# Patient Record
Sex: FEMALE | Race: WHITE | NOT HISPANIC OR LATINO | Employment: OTHER
[De-identification: names, ages, dates, MRNs, and addresses within clinical notes are randomized per-mention and may not be internally consistent; named-entity substitution may affect disease eponyms.]

---

## 2017-06-17 ENCOUNTER — HEALTH MAINTENANCE LETTER (OUTPATIENT)
Age: 57
End: 2017-06-17

## 2021-08-22 ENCOUNTER — HEALTH MAINTENANCE LETTER (OUTPATIENT)
Age: 61
End: 2021-08-22

## 2021-10-17 ENCOUNTER — HEALTH MAINTENANCE LETTER (OUTPATIENT)
Age: 61
End: 2021-10-17

## 2022-03-16 ENCOUNTER — TELEPHONE (OUTPATIENT)
Dept: UROLOGY | Facility: CLINIC | Age: 62
End: 2022-03-16
Payer: COMMERCIAL

## 2022-03-16 NOTE — TELEPHONE ENCOUNTER
Health Call Center    Phone Message    May a detailed message be left on voicemail: yes     Reason for Call: Patient of Dr. Ferreira (10 years ago) calling to set up an appt for retention. Dr. Ferreira did a surgery on her to help with this issue and she is needing it done again. Was not able to locate these records. Please reach out to patient to discuss/schedule. Thank you!    Action Taken: Message routed to:  [facility] ([facility] Uro    Travel Screening: Not Applicable

## 2022-04-15 ENCOUNTER — OFFICE VISIT (OUTPATIENT)
Dept: UROLOGY | Facility: CLINIC | Age: 62
End: 2022-04-15
Payer: COMMERCIAL

## 2022-04-15 VITALS
WEIGHT: 155 LBS | SYSTOLIC BLOOD PRESSURE: 128 MMHG | BODY MASS INDEX: 24.91 KG/M2 | HEIGHT: 66 IN | DIASTOLIC BLOOD PRESSURE: 70 MMHG

## 2022-04-15 DIAGNOSIS — N20.1 URETERAL STONE: ICD-10-CM

## 2022-04-15 DIAGNOSIS — R33.9 URINARY RETENTION: Primary | ICD-10-CM

## 2022-04-15 DIAGNOSIS — R39.15 URGENCY OF URINATION: ICD-10-CM

## 2022-04-15 LAB — RESIDUAL VOLUME (RV) (EXTERNAL): 48

## 2022-04-15 PROCEDURE — 99203 OFFICE O/P NEW LOW 30 MIN: CPT | Mod: 25 | Performed by: PHYSICIAN ASSISTANT

## 2022-04-15 PROCEDURE — 51798 US URINE CAPACITY MEASURE: CPT | Performed by: PHYSICIAN ASSISTANT

## 2022-04-15 RX ORDER — ESTRADIOL 0.1 MG/G
CREAM VAGINAL
Qty: 42.5 G | Refills: 3 | Status: SHIPPED | OUTPATIENT
Start: 2022-04-15 | End: 2023-06-21

## 2022-04-15 RX ORDER — ESTRADIOL 1 MG/1
1 TABLET ORAL DAILY
Status: ON HOLD | COMMUNITY
Start: 2022-01-21 | End: 2023-03-08

## 2022-04-15 RX ORDER — VILAZODONE HYDROCHLORIDE 40 MG/1
TABLET ORAL
COMMUNITY
Start: 2022-03-22

## 2022-04-15 RX ORDER — PROGESTERONE 100 MG/1
100 CAPSULE ORAL 2 TIMES DAILY
Status: ON HOLD | COMMUNITY
Start: 2022-01-14 | End: 2023-03-08

## 2022-04-15 ASSESSMENT — PAIN SCALES - GENERAL: PAINLEVEL: NO PAIN (0)

## 2022-04-15 NOTE — LETTER
4/15/2022       RE: Andree Garcia  [address]  [facility] [address]     Dear Colleague,    Thank you for referring your patient, Andree Garcia, to the [facility] [place] at [facility]. Please see a copy of my visit note below.    CC: urinary concern    HPI:  Andree Garcia is a 61 year old female who presents for concerns about her stream slowing.  10+ years ago had urethral dilation x 2 with Dr. Ferreira due to recurrent UTIs. No UTIs in the interim. Sense of not emptying and noting more urgency. Bladder scan very low and normal today. No dysuria, gross hematuria.      Past Medical History:   Diagnosis Date     Anxiety     developed with menopause     Chest pain     non cardiac     Costochondritis      Hyperlipidemia      Lyme disease        Past Surgical History:   Procedure Laterality Date     COLONOSCOPY  9/11/2012    Procedure: COLONOSCOPY;  COLONOSCOPY,  ESOPHAGOSCOPY, GASTROSCOPY, DUODENOSCOPY;  Surgeon: Dominic Gray MD;  Location:  GI     DILATION AND CURETTAGE       ESOPHAGOSCOPY, GASTROSCOPY, DUODENOSCOPY (EGD), COMBINED  9/11/2012    Procedure: COMBINED ESOPHAGOSCOPY, GASTROSCOPY, DUODENOSCOPY (EGD);;  Surgeon: Dominic Gray MD;  Location:  GI     EXCISE CONDYLOMA RECTUM       uterine polypectomy         Social History     Socioeconomic History     Marital status:      Spouse name: Not on file     Number of children: Not on file     Years of education: Not on file     Highest education level: Not on file   Occupational History     Not on file   Tobacco Use     Smoking status: Never Smoker     Smokeless tobacco: Never Used   Substance and Sexual Activity     Alcohol use: Yes     Comment: social - maybe 3 times week     Drug use: No     Sexual activity: Yes     Partners: Male   Other Topics Concern     Parent/sibling w/ CABG, MI or angioplasty before 65F 55M? Not Asked      Service Not Asked     Blood  "Transfusions Not Asked     Caffeine Concern Yes     Comment: 1/2 cup of coffee      Occupational Exposure Not Asked     Hobby Hazards Not Asked     Sleep Concern No     Stress Concern No     Weight Concern Not Asked     Special Diet Yes     Comment: whole foods, healthy      Back Care Not Asked     Exercise Yes     Comment: walking, yoga      Bike Helmet Not Asked     Seat Belt Yes     Self-Exams Not Asked   Social History Narrative     Not on file     Social Determinants of Health     Financial Resource Strain: Not on file   Food Insecurity: Not on file   Transportation Needs: Not on file   Physical Activity: Not on file   Stress: Not on file   Social Connections: Not on file   Intimate Partner Violence: Not on file   Housing Stability: Not on file       Family History   Problem Relation Age of Onset     Cardiovascular Father      Cerebrovascular Disease Father      C.A.D. Father      Arthritis Brother 15        juvenile arthritis     Breast Cancer Sister 50        breast cancer - in complete remission     Thyroid Disease Mother      Other - See Comments Sister         brain tumor survivor       Allergies   Allergen Reactions     Para Aminobenzoate [Aminobenzoate]      Sulfa Drugs      Hives       Current Outpatient Medications   Medication     estradiol (ESTRACE VAGINAL) 0.1 MG/GM vaginal cream     estradiol (ESTRACE VAGINAL) 0.1 MG/GM vaginal cream     estradiol (ESTRACE) 1 MG tablet     Multiple Vitamin (MULTIVITAMIN OR)     Omega-3 Fatty Acids (OMEGA III EPA+DHA PO)     progesterone (PROMETRIUM) 100 MG capsule     VIIBRYD 40 MG TABS tablet     Cholecalciferol (VITAMIN D3)     progesterone (PROMETRIUM) 100 MG capsule     No current facility-administered medications for this visit.         PEx:   Blood pressure 128/70, height 1.676 m (5' 6\"), weight 70.3 kg (155 lb).    PSYCH: NAD  EYES: EOMI  MOUTH: MMM  NEURO: AAO x3    PVR 48cc      A/P: Andree DALY White is a 61 year old female with slower stream, probable pelvic " floor dysfunction, mild urgency.  -We discussed that uretral dilation is a thing of the past and the standard of care for these complaints is topical estrogen cream and pelvic floor physical therapy.   -She will consider both.     Sofia Jacobs PA-C  [facility]        23 minutes spent on the date of the encounter doing chart review, review of outside records, review of test results, interpretation of tests, patient visit and documentation

## 2022-04-15 NOTE — NURSING NOTE
Chief Complaint   Patient presents with     Urinary Retention     UA,PVR     PVR scan 48ml today    Kaitlin Garrido

## 2022-04-15 NOTE — PATIENT INSTRUCTIONS
Dr. Cristhain Song  ____________________________________________  Estrogen cream three times a week near urethra (pea-sized amount): If >$50, let me know and we can get via a compound pharmacy.  __________________________________________________    Please see one of the dedicated pelvic floor physical therapists ([facility] [phone]).    Please be aware that coverage of these services is subject to the terms and limitations of your health insurance plan.  Call member services at your health plan with any benefit or coverage questions.      Please bring the following to your appointment:    *Your personal calendar for scheduling future appointments  *Comfortable clothing  ____________________________________________________________________________________________________  Websites with free information:     [employer] patient website: [email or web address]     Total Control Program: www.[email or web address]

## 2022-04-15 NOTE — PROGRESS NOTES
CC: urinary concern    HPI:  Andree Garcia is a 61 year old female who presents for concerns about her stream slowing.  10+ years ago had urethral dilation x 2 with Dr. Ferreira due to recurrent UTIs. No UTIs in the interim. Sense of not emptying and noting more urgency. Bladder scan very low and normal today. No dysuria, gross hematuria.      Past Medical History:   Diagnosis Date     Anxiety     developed with menopause     Chest pain     non cardiac     Costochondritis      Hyperlipidemia      Lyme disease        Past Surgical History:   Procedure Laterality Date     COLONOSCOPY  9/11/2012    Procedure: COLONOSCOPY;  COLONOSCOPY,  ESOPHAGOSCOPY, GASTROSCOPY, DUODENOSCOPY;  Surgeon: Dominic Gray MD;  Location:  GI     DILATION AND CURETTAGE       ESOPHAGOSCOPY, GASTROSCOPY, DUODENOSCOPY (EGD), COMBINED  9/11/2012    Procedure: COMBINED ESOPHAGOSCOPY, GASTROSCOPY, DUODENOSCOPY (EGD);;  Surgeon: Dominic Gray MD;  Location:  GI     EXCISE CONDYLOMA RECTUM       uterine polypectomy         Social History     Socioeconomic History     Marital status:      Spouse name: Not on file     Number of children: Not on file     Years of education: Not on file     Highest education level: Not on file   Occupational History     Not on file   Tobacco Use     Smoking status: Never Smoker     Smokeless tobacco: Never Used   Substance and Sexual Activity     Alcohol use: Yes     Comment: social - maybe 3 times week     Drug use: No     Sexual activity: Yes     Partners: Male   Other Topics Concern     Parent/sibling w/ CABG, MI or angioplasty before 65F 55M? Not Asked      Service Not Asked     Blood Transfusions Not Asked     Caffeine Concern Yes     Comment: 1/2 cup of coffee      Occupational Exposure Not Asked     Hobby Hazards Not Asked     Sleep Concern No     Stress Concern No     Weight Concern Not Asked     Special Diet Yes     Comment: whole foods, healthy      Back Care Not Asked     Exercise Yes      "Comment: walking, yoga      Bike Helmet Not Asked     Seat Belt Yes     Self-Exams Not Asked   Social History Narrative     Not on file     Social Determinants of Health     Financial Resource Strain: Not on file   Food Insecurity: Not on file   Transportation Needs: Not on file   Physical Activity: Not on file   Stress: Not on file   Social Connections: Not on file   Intimate Partner Violence: Not on file   Housing Stability: Not on file       Family History   Problem Relation Age of Onset     Cardiovascular Father      Cerebrovascular Disease Father      C.A.D. Father      Arthritis Brother 15        juvenile arthritis     Breast Cancer Sister 50        breast cancer - in complete remission     Thyroid Disease Mother      Other - See Comments Sister         brain tumor survivor       Allergies   Allergen Reactions     Para Aminobenzoate [Aminobenzoate]      Sulfa Drugs      Hives       Current Outpatient Medications   Medication     estradiol (ESTRACE VAGINAL) 0.1 MG/GM vaginal cream     estradiol (ESTRACE VAGINAL) 0.1 MG/GM vaginal cream     estradiol (ESTRACE) 1 MG tablet     Multiple Vitamin (MULTIVITAMIN OR)     Omega-3 Fatty Acids (OMEGA III EPA+DHA PO)     progesterone (PROMETRIUM) 100 MG capsule     VIIBRYD 40 MG TABS tablet     Cholecalciferol (VITAMIN D3)     progesterone (PROMETRIUM) 100 MG capsule     No current facility-administered medications for this visit.         PEx:   Blood pressure 128/70, height 1.676 m (5' 6\"), weight 70.3 kg (155 lb).    PSYCH: NAD  EYES: EOMI  MOUTH: MMM  NEURO: AAO x3    PVR 48cc      A/P: Andree DALY White is a 61 year old female with slower stream, probable pelvic floor dysfunction, mild urgency.  -We discussed that uretral dilation is a thing of the past and the standard of care for these complaints is topical estrogen cream and pelvic floor physical therapy.   -She will consider both.     GUILLE Omalley [facility]        23 minutes spent on the date of the " encounter doing chart review, review of outside records, review of test results, interpretation of tests, patient visit and documentation

## 2022-04-16 ENCOUNTER — HEALTH MAINTENANCE LETTER (OUTPATIENT)
Age: 62
End: 2022-04-16

## 2022-10-22 ENCOUNTER — HEALTH MAINTENANCE LETTER (OUTPATIENT)
Age: 62
End: 2022-10-22

## 2023-03-02 ENCOUNTER — TRANSFERRED RECORDS (OUTPATIENT)
Dept: HEALTH INFORMATION MANAGEMENT | Facility: CLINIC | Age: 63
End: 2023-03-02

## 2023-03-02 ENCOUNTER — OFFICE VISIT (OUTPATIENT)
Dept: CARDIOLOGY | Facility: CLINIC | Age: 63
End: 2023-03-02
Attending: INTERNAL MEDICINE
Payer: COMMERCIAL

## 2023-03-02 VITALS
HEART RATE: 84 BPM | WEIGHT: 154.9 LBS | OXYGEN SATURATION: 96 % | SYSTOLIC BLOOD PRESSURE: 128 MMHG | DIASTOLIC BLOOD PRESSURE: 82 MMHG | BODY MASS INDEX: 25 KG/M2

## 2023-03-02 DIAGNOSIS — R00.2 PALPITATIONS: Primary | ICD-10-CM

## 2023-03-02 PROCEDURE — 99212 OFFICE O/P EST SF 10 MIN: CPT | Mod: 25 | Performed by: INTERNAL MEDICINE

## 2023-03-02 PROCEDURE — 99203 OFFICE O/P NEW LOW 30 MIN: CPT | Mod: GC | Performed by: INTERNAL MEDICINE

## 2023-03-02 PROCEDURE — 93005 ELECTROCARDIOGRAM TRACING: CPT | Performed by: INTERNAL MEDICINE

## 2023-03-02 RX ORDER — PROPRANOLOL HYDROCHLORIDE 10 MG/1
10 TABLET ORAL DAILY
Status: ON HOLD | COMMUNITY
Start: 2022-05-11 | End: 2023-03-08

## 2023-03-02 RX ORDER — LEVOTHYROXINE, LIOTHYRONINE 57; 13.5 UG/1; UG/1
2 TABLET ORAL
Status: ON HOLD | COMMUNITY
Start: 2023-02-16 | End: 2023-03-08

## 2023-03-02 ASSESSMENT — PAIN SCALES - GENERAL: PAINLEVEL: NO PAIN (0)

## 2023-03-02 NOTE — LETTER
3/2/2023      RE: Andree Garcia  [address]  [facility] [address]       Dear Colleague,    Thank you for the opportunity to participate in the care of your patient, Andree Garcia, at the [facility] [place] at [facility]. Please see a copy of my visit note below.              Cardiology Consultation  03/01/2023    HPI:  61 yo F PMH HLD, hypothyroidism, and recurrent UTIs here to establish CV care. She had seen Dr. White in 2014 at which time he had ordered a CAC score, which was 0. Risk factor profile (-) HTN, (-) DM, (+) hypercholesterolemia, (-) tobacco use, (+) family Hx of premature CAD.    CAC score on 8/29/14.  Her calcium score throughout her epicardial vessels was 0, therefore, she has not been on a statin.    Over the past month she has been having palpitations several times per day. These palpitations come on suddenly for 30 minutes to an hour at a time. She will sometimes wake up in the morning and have these palpitations. She has never had these symptoms before. Denies chest pressure, shortness of breath, dyspnea on exertion, syncope or presyncope.    Also of note, her thyroid medication was adjusted recently; however, the provider does not use Epic and I cannot see her most recent TSH results.    ROS:  A complete 10-point ROS was negative except as above.    PAST MEDICAL HISTORY:  Past Medical History:   Diagnosis Date     Anxiety     developed with menopause     Chest pain     non cardiac     Costochondritis      Hyperlipidemia      Lyme disease        PAST SURGICAL HISTORY:  Past Surgical History:   Procedure Laterality Date     COLONOSCOPY  9/11/2012    Procedure: COLONOSCOPY;  COLONOSCOPY,  ESOPHAGOSCOPY, GASTROSCOPY, DUODENOSCOPY;  Surgeon: Dominic Gray MD;  Location:  GI     DILATION AND CURETTAGE       ESOPHAGOSCOPY, GASTROSCOPY, DUODENOSCOPY (EGD), COMBINED  9/11/2012    Procedure: COMBINED ESOPHAGOSCOPY,  GASTROSCOPY, DUODENOSCOPY (EGD);;  Surgeon: Dominic Gray MD;  Location:  GI     EXCISE CONDYLOMA RECTUM       uterine polypectomy         FAMILY HISTORY:  Family History   Problem Relation Age of Onset     Cardiovascular Father      Cerebrovascular Disease Father      C.A.D. Father      Arthritis Brother 15        juvenile arthritis     Breast Cancer Sister 50        breast cancer - in complete remission     Thyroid Disease Mother      Other - See Comments Sister         brain tumor survivor       SOCIAL HISTORY:  Social History     Socioeconomic History     Marital status:    Tobacco Use     Smoking status: Never     Smokeless tobacco: Never   Substance and Sexual Activity     Alcohol use: Yes     Comment: social - maybe 3 times week     Drug use: No     Sexual activity: Yes     Partners: Male   Other Topics Concern     Caffeine Concern Yes     Comment: 1/2 cup of coffee      Sleep Concern No     Stress Concern No     Special Diet Yes     Comment: whole foods, healthy      Exercise Yes     Comment: walking, yoga      Seat Belt Yes       MEDICATIONS:  Current Outpatient Medications   Medication     Cholecalciferol (VITAMIN D3)     estradiol (ESTRACE VAGINAL) 0.1 MG/GM vaginal cream     estradiol (ESTRACE VAGINAL) 0.1 MG/GM vaginal cream     estradiol (ESTRACE) 1 MG tablet     Multiple Vitamin (MULTIVITAMIN OR)     Omega-3 Fatty Acids (OMEGA III EPA+DHA PO)     progesterone (PROMETRIUM) 100 MG capsule     progesterone (PROMETRIUM) 100 MG capsule     VIIBRYD 40 MG TABS tablet     No current facility-administered medications for this visit.       ALLERGIES:     Allergies   Allergen Reactions     Para Aminobenzoate [Aminobenzoate]      Sulfa Drugs      Hives       PHYSICAL EXAM:  There were no vitals taken for this visit.  Gen: alert, interactive, NAD  HEENT: atraumatic, EOMI, MMM  Neck: supple, no JVD  CV: RRR, no m/r/g  Chest: CTAB, no wheezes or crackles  Abd: soft, NT, ND, +BS  Ext: no LE edema, 2+  peripheral pulses  Skin: warm and dry, no rashes on exposed surfaces  Neuro: alert and oriented, no focal deficits    LABS:    CMP  Last Comprehensive Metabolic Panel:  Sodium   Date Value Ref Range Status   08/10/2012 138 133 - 144 mmol/L Final     Potassium   Date Value Ref Range Status   08/10/2012 3.9 3.4 - 5.3 mmol/L Final     Chloride   Date Value Ref Range Status   08/10/2012 103 94 - 109 mmol/L Final     Carbon Dioxide   Date Value Ref Range Status   08/10/2012 27 20 - 32 mmol/L Final     Anion Gap   Date Value Ref Range Status   08/10/2012 8 6 - 17 mmol/L Final     Glucose   Date Value Ref Range Status   08/10/2012 94 60 - 99 mg/dL Final     Urea Nitrogen   Date Value Ref Range Status   08/10/2012 13 5 - 24 mg/dL Final     Creatinine   Date Value Ref Range Status   08/10/2012 0.66 0.52 - 1.04 mg/dL Final     GFR Estimate   Date Value Ref Range Status   08/10/2012 >90 >60 mL/min/1.7m2 Final     Calcium   Date Value Ref Range Status   08/10/2012 9.5 8.5 - 10.4 mg/dL Final     Bilirubin Total   Date Value Ref Range Status   08/10/2012 0.4 0.2 - 1.3 mg/dL Final     Alkaline Phosphatase   Date Value Ref Range Status   08/10/2012 62 40 - 150 U/L Final     ALT   Date Value Ref Range Status   08/10/2012 23 0 - 50 U/L Final     AST   Date Value Ref Range Status   08/10/2012 21 0 - 45 U/L Final       CBC  CBC RESULTS: No results for input(s): WBC, RBC, HGB, HCT, MCV, MCH, MCHC, RDW, PLT in the last 54388 hours.    TROP  Lab Results   Component Value Date    TROPONIN 0.00 06/28/2012       LIPIDS  No results for input(s): CHOL, HDL, LDL, TRIG, CHOLHDLRATIO in the last 21270 hours.    TSH  TSH   Date Value Ref Range Status   12/14/2012 <0.02 (L) 0.4 - 5.0 mU/L Final       HBA1C  No results found for: A1C    CARDIAC DATA:  EKG: NSR with left atrial enlargement    CAC:       CORONARY ARTERY CALCIUM SCORES:     Left main coronary artery: 0  Left anterior descending coronary artery: 0  Circumflex coronary artery: 0   Right  coronary artery: 0     TOTAL CALCIUM SCORE: 0     The total Agatston calcium score is 0.    ECHO: none    STRESS TEST:  none    CARDIAC CATH:  none      ASSESSMENT/PLAN:  63 yo F PMH HLD and recurrent UTIs here for palpitations    1. Palpitations  H/o hypothyroidism  - CardioNet x 1 week. Referral to EP if any significant arrhythmias noted  - check TTE  - check TSH, T3, T4    Pt seen and discussed with Dr. Gonzalez.    Yanique Cole MD  Cardiology Fellow    Patient seen and examined with Cardiovascular fellow and agree with the assessment and plan described above.     Reji Gonzalez M.D.  Interventional Cardiology  [facility]               Please do not hesitate to contact me if you have any questions/concerns.     Sincerely,     Reji Gonzalez MD

## 2023-03-02 NOTE — PROGRESS NOTES
Cardiology Consultation  03/01/2023    HPI:  61 yo F PMH HLD, hypothyroidism, and recurrent UTIs here to establish CV care. She had seen Dr. White in 2014 at which time he had ordered a CAC score, which was 0. Risk factor profile (-) HTN, (-) DM, (+) hypercholesterolemia, (-) tobacco use, (+) family Hx of premature CAD.    CAC score on 8/29/14.  Her calcium score throughout her epicardial vessels was 0, therefore, she has not been on a statin.    Over the past month she has been having palpitations several times per day. These palpitations come on suddenly for 30 minutes to an hour at a time. She will sometimes wake up in the morning and have these palpitations. She has never had these symptoms before. Denies chest pressure, shortness of breath, dyspnea on exertion, syncope or presyncope.    Also of note, her thyroid medication was adjusted recently; however, the provider does not use Epic and I cannot see her most recent TSH results.    ROS:  A complete 10-point ROS was negative except as above.    PAST MEDICAL HISTORY:  Past Medical History:   Diagnosis Date     Anxiety     developed with menopause     Chest pain     non cardiac     Costochondritis      Hyperlipidemia      Lyme disease        PAST SURGICAL HISTORY:  Past Surgical History:   Procedure Laterality Date     COLONOSCOPY  9/11/2012    Procedure: COLONOSCOPY;  COLONOSCOPY,  ESOPHAGOSCOPY, GASTROSCOPY, DUODENOSCOPY;  Surgeon: Dominic Gray MD;  Location:  GI     DILATION AND CURETTAGE       ESOPHAGOSCOPY, GASTROSCOPY, DUODENOSCOPY (EGD), COMBINED  9/11/2012    Procedure: COMBINED ESOPHAGOSCOPY, GASTROSCOPY, DUODENOSCOPY (EGD);;  Surgeon: Dominic Gray MD;  Location:  GI     EXCISE CONDYLOMA RECTUM       uterine polypectomy         FAMILY HISTORY:  Family History   Problem Relation Age of Onset     Cardiovascular Father      Cerebrovascular Disease Father      C.A.D. Father      Arthritis Brother 15        juvenile arthritis     Breast  Cancer Sister 50        breast cancer - in complete remission     Thyroid Disease Mother      Other - See Comments Sister         brain tumor survivor       SOCIAL HISTORY:  Social History     Socioeconomic History     Marital status:    Tobacco Use     Smoking status: Never     Smokeless tobacco: Never   Substance and Sexual Activity     Alcohol use: Yes     Comment: social - maybe 3 times week     Drug use: No     Sexual activity: Yes     Partners: Male   Other Topics Concern     Caffeine Concern Yes     Comment: 1/2 cup of coffee      Sleep Concern No     Stress Concern No     Special Diet Yes     Comment: whole foods, healthy      Exercise Yes     Comment: walking, yoga      Seat Belt Yes       MEDICATIONS:  Current Outpatient Medications   Medication     Cholecalciferol (VITAMIN D3)     estradiol (ESTRACE VAGINAL) 0.1 MG/GM vaginal cream     estradiol (ESTRACE VAGINAL) 0.1 MG/GM vaginal cream     estradiol (ESTRACE) 1 MG tablet     Multiple Vitamin (MULTIVITAMIN OR)     Omega-3 Fatty Acids (OMEGA III EPA+DHA PO)     progesterone (PROMETRIUM) 100 MG capsule     progesterone (PROMETRIUM) 100 MG capsule     VIIBRYD 40 MG TABS tablet     No current facility-administered medications for this visit.       ALLERGIES:     Allergies   Allergen Reactions     Para Aminobenzoate [Aminobenzoate]      Sulfa Drugs      Hives       PHYSICAL EXAM:  There were no vitals taken for this visit.  Gen: alert, interactive, NAD  HEENT: atraumatic, EOMI, MMM  Neck: supple, no JVD  CV: RRR, no m/r/g  Chest: CTAB, no wheezes or crackles  Abd: soft, NT, ND, +BS  Ext: no LE edema, 2+ peripheral pulses  Skin: warm and dry, no rashes on exposed surfaces  Neuro: alert and oriented, no focal deficits    LABS:    CMP  Last Comprehensive Metabolic Panel:  Sodium   Date Value Ref Range Status   08/10/2012 138 133 - 144 mmol/L Final     Potassium   Date Value Ref Range Status   08/10/2012 3.9 3.4 - 5.3 mmol/L Final     Chloride   Date Value  Ref Range Status   08/10/2012 103 94 - 109 mmol/L Final     Carbon Dioxide   Date Value Ref Range Status   08/10/2012 27 20 - 32 mmol/L Final     Anion Gap   Date Value Ref Range Status   08/10/2012 8 6 - 17 mmol/L Final     Glucose   Date Value Ref Range Status   08/10/2012 94 60 - 99 mg/dL Final     Urea Nitrogen   Date Value Ref Range Status   08/10/2012 13 5 - 24 mg/dL Final     Creatinine   Date Value Ref Range Status   08/10/2012 0.66 0.52 - 1.04 mg/dL Final     GFR Estimate   Date Value Ref Range Status   08/10/2012 >90 >60 mL/min/1.7m2 Final     Calcium   Date Value Ref Range Status   08/10/2012 9.5 8.5 - 10.4 mg/dL Final     Bilirubin Total   Date Value Ref Range Status   08/10/2012 0.4 0.2 - 1.3 mg/dL Final     Alkaline Phosphatase   Date Value Ref Range Status   08/10/2012 62 40 - 150 U/L Final     ALT   Date Value Ref Range Status   08/10/2012 23 0 - 50 U/L Final     AST   Date Value Ref Range Status   08/10/2012 21 0 - 45 U/L Final       CBC  CBC RESULTS: No results for input(s): WBC, RBC, HGB, HCT, MCV, MCH, MCHC, RDW, PLT in the last 98057 hours.    TROP  Lab Results   Component Value Date    TROPONIN 0.00 06/28/2012       LIPIDS  No results for input(s): CHOL, HDL, LDL, TRIG, CHOLHDLRATIO in the last 54758 hours.    TSH  TSH   Date Value Ref Range Status   12/14/2012 <0.02 (L) 0.4 - 5.0 mU/L Final       HBA1C  No results found for: A1C    CARDIAC DATA:  EKG: NSR with left atrial enlargement    CAC:       CORONARY ARTERY CALCIUM SCORES:     Left main coronary artery: 0  Left anterior descending coronary artery: 0  Circumflex coronary artery: 0   Right coronary artery: 0     TOTAL CALCIUM SCORE: 0     The total Agatston calcium score is 0.    ECHO: none    STRESS TEST:  none    CARDIAC CATH:  none      ASSESSMENT/PLAN:  63 yo F PMH HLD and recurrent UTIs here for palpitations    1. Palpitations  H/o hypothyroidism  - CardioNet x 1 week. Referral to EP if any significant arrhythmias noted  - check  TTE  - check TSH, T3, T4    Pt seen and discussed with Dr. Gonzalez.    Yanique Cole MD  Cardiology Fellow    Patient seen and examined with Cardiovascular fellow and agree with the assessment and plan described above.     Reji Gonzalez M.D.  Interventional Cardiology  [facility]

## 2023-03-02 NOTE — PATIENT INSTRUCTIONS
You were seen today in the Cardiovascular Clinic at the [facility].      Cardiology Providers you saw during your visit:  Dr. Gonzalez    Recommendations:  Wear a cardionet heart monitor for 7days. Have an ECHO and lab draw- TSH. T3 & T4.    Pre-procedure instructions - Echocardiogram  Patient Education    Your arrival time is 8:00 am for labs then 8:15 am for your ECHO on 3/16/2023.  Location is [address]    How do I prepare for my exam?    You may eat, drink and take your normal medicines.  Please do not apply perfumes or lotions on the day of your exam.    What Should I wear?  Please bring or wear a comfortable two-piece outfit.     How long does the Exam Take? Most tests take up to 60 minutes.        Do I need a ? No      What is this test An echocardiogram uses sound waves to produce images of your heart. This common test allows your doctor to see your heart beating and pumping blood. Your doctor can use the images from an echocardiogram to identify heart disease.     Follow-up:  Will be based upon the results of your testing.      Nursing questions:  [employer];  [phone]  For emergencies call 911.      Thank you for your visit today!     Mellisa Geiger RN  [facility]  [facility], [employer] and Watch[facility] Programs  [facility]    Meli Perdomo, Lead [facility] Office: [phone]

## 2023-03-02 NOTE — NURSING NOTE
Chief Complaint   Patient presents with     New Patient     61 yo FM, here for evaluation of palpitations.      Vitals were taken, medications reconciled, and EKG was performed.    Angel Baird, EMT  3:08 PM

## 2023-03-02 NOTE — LETTER
Date:March 6, 2023      Patient was self referred, no letter generated. Do not send.        [facility] Health Information

## 2023-03-05 LAB
ATRIAL RATE - MUSE: 79 BPM
DIASTOLIC BLOOD PRESSURE - MUSE: NORMAL MMHG
INTERPRETATION ECG - MUSE: NORMAL
P AXIS - MUSE: 71 DEGREES
PR INTERVAL - MUSE: 172 MS
QRS DURATION - MUSE: 66 MS
QT - MUSE: 380 MS
QTC - MUSE: 435 MS
R AXIS - MUSE: 29 DEGREES
SYSTOLIC BLOOD PRESSURE - MUSE: NORMAL MMHG
T AXIS - MUSE: 35 DEGREES
VENTRICULAR RATE- MUSE: 79 BPM

## 2023-03-06 ENCOUNTER — APPOINTMENT (OUTPATIENT)
Dept: GENERAL RADIOLOGY | Facility: CLINIC | Age: 63
End: 2023-03-06
Attending: EMERGENCY MEDICINE
Payer: COMMERCIAL

## 2023-03-06 ENCOUNTER — APPOINTMENT (OUTPATIENT)
Dept: CT IMAGING | Facility: CLINIC | Age: 63
End: 2023-03-06
Attending: EMERGENCY MEDICINE
Payer: COMMERCIAL

## 2023-03-06 ENCOUNTER — HOSPITAL ENCOUNTER (OUTPATIENT)
Facility: CLINIC | Age: 63
Setting detail: OBSERVATION
Discharge: HOME | End: 2023-03-08
Attending: EMERGENCY MEDICINE | Admitting: HOSPITALIST
Payer: COMMERCIAL

## 2023-03-06 ENCOUNTER — TELEPHONE (OUTPATIENT)
Dept: CARDIOLOGY | Facility: CLINIC | Age: 63
End: 2023-03-06
Payer: COMMERCIAL

## 2023-03-06 DIAGNOSIS — E87.29 HIGH ANION GAP METABOLIC ACIDOSIS: ICD-10-CM

## 2023-03-06 DIAGNOSIS — R79.89 ELEVATED SERUM FREE T4 LEVEL: ICD-10-CM

## 2023-03-06 DIAGNOSIS — R79.89 ELEVATED TROPONIN: ICD-10-CM

## 2023-03-06 DIAGNOSIS — E83.42 HYPOMAGNESEMIA: ICD-10-CM

## 2023-03-06 DIAGNOSIS — R10.32 ABDOMINAL PAIN, LEFT LOWER QUADRANT: ICD-10-CM

## 2023-03-06 DIAGNOSIS — R79.89 ELEVATED BRAIN NATRIURETIC PEPTIDE (BNP) LEVEL: ICD-10-CM

## 2023-03-06 DIAGNOSIS — R79.89 LOW TSH LEVEL: ICD-10-CM

## 2023-03-06 DIAGNOSIS — Z20.822 LAB TEST NEGATIVE FOR COVID-19 VIRUS: ICD-10-CM

## 2023-03-06 DIAGNOSIS — D72.829 LEUKOCYTOSIS, UNSPECIFIED TYPE: ICD-10-CM

## 2023-03-06 DIAGNOSIS — I48.91 ATRIAL FIBRILLATION WITH RVR (H): ICD-10-CM

## 2023-03-06 DIAGNOSIS — R19.7 NAUSEA VOMITING AND DIARRHEA: ICD-10-CM

## 2023-03-06 DIAGNOSIS — R11.2 NAUSEA VOMITING AND DIARRHEA: ICD-10-CM

## 2023-03-06 LAB
ALBUMIN SERPL BCG-MCNC: 4.7 G/DL (ref 3.5–5.2)
ALP SERPL-CCNC: 89 U/L (ref 35–104)
ALT SERPL W P-5'-P-CCNC: 34 U/L (ref 10–35)
ANION GAP SERPL CALCULATED.3IONS-SCNC: 22 MMOL/L (ref 7–15)
AST SERPL W P-5'-P-CCNC: 27 U/L (ref 10–35)
ATRIAL RATE - MUSE: 92 BPM
B-OH-BUTYR SERPL-SCNC: 1.3 MMOL/L
BASOPHILS # BLD AUTO: 0 10E3/UL (ref 0–0.2)
BASOPHILS NFR BLD AUTO: 0 %
BILIRUB SERPL-MCNC: 0.6 MG/DL
BUN SERPL-MCNC: 22.5 MG/DL (ref 8–23)
CALCIUM SERPL-MCNC: 10.5 MG/DL (ref 8.8–10.2)
CHLORIDE SERPL-SCNC: 104 MMOL/L (ref 98–107)
CREAT SERPL-MCNC: 0.72 MG/DL (ref 0.51–0.95)
DEPRECATED HCO3 PLAS-SCNC: 16 MMOL/L (ref 22–29)
DIASTOLIC BLOOD PRESSURE - MUSE: NORMAL MMHG
EOSINOPHIL # BLD AUTO: 0.1 10E3/UL (ref 0–0.7)
EOSINOPHIL NFR BLD AUTO: 1 %
ERYTHROCYTE [DISTWIDTH] IN BLOOD BY AUTOMATED COUNT: 11.9 % (ref 10–15)
GFR SERPL CREATININE-BSD FRML MDRD: >90 ML/MIN/1.73M2
GLUCOSE SERPL-MCNC: 212 MG/DL (ref 70–99)
HCT VFR BLD AUTO: 46.3 % (ref 35–47)
HGB BLD-MCNC: 15.3 G/DL (ref 11.7–15.7)
HOLD SPECIMEN: NORMAL
IMM GRANULOCYTES # BLD: 0 10E3/UL
IMM GRANULOCYTES NFR BLD: 0 %
INR PPP: 1 (ref 0.85–1.15)
INTERPRETATION ECG - MUSE: NORMAL
LACTATE SERPL-SCNC: 0.9 MMOL/L (ref 0.7–2)
LIPASE SERPL-CCNC: 29 U/L (ref 13–60)
LYMPHOCYTES # BLD AUTO: 0.6 10E3/UL (ref 0.8–5.3)
LYMPHOCYTES NFR BLD AUTO: 5 %
MAGNESIUM SERPL-MCNC: 1.4 MG/DL (ref 1.7–2.3)
MCH RBC QN AUTO: 29.5 PG (ref 26.5–33)
MCHC RBC AUTO-ENTMCNC: 33 G/DL (ref 31.5–36.5)
MCV RBC AUTO: 89 FL (ref 78–100)
MONOCYTES # BLD AUTO: 0.7 10E3/UL (ref 0–1.3)
MONOCYTES NFR BLD AUTO: 6 %
NEUTROPHILS # BLD AUTO: 10.7 10E3/UL (ref 1.6–8.3)
NEUTROPHILS NFR BLD AUTO: 88 %
NRBC # BLD AUTO: 0 10E3/UL
NRBC BLD AUTO-RTO: 0 /100
NT-PROBNP SERPL-MCNC: 1174 PG/ML (ref 0–900)
P AXIS - MUSE: NORMAL DEGREES
PLATELET # BLD AUTO: 462 10E3/UL (ref 150–450)
POTASSIUM SERPL-SCNC: 4.1 MMOL/L (ref 3.4–5.3)
PR INTERVAL - MUSE: NORMAL MS
PROT SERPL-MCNC: 8.6 G/DL (ref 6.4–8.3)
QRS DURATION - MUSE: 50 MS
QT - MUSE: 244 MS
QTC - MUSE: 437 MS
R AXIS - MUSE: 78 DEGREES
RBC # BLD AUTO: 5.19 10E6/UL (ref 3.8–5.2)
SODIUM SERPL-SCNC: 142 MMOL/L (ref 136–145)
SYSTOLIC BLOOD PRESSURE - MUSE: NORMAL MMHG
T AXIS - MUSE: 20 DEGREES
T4 FREE SERPL-MCNC: 2.32 NG/DL (ref 0.9–1.7)
TROPONIN T SERPL HS-MCNC: 19 NG/L
TROPONIN T SERPL HS-MCNC: 20 NG/L
TSH SERPL DL<=0.005 MIU/L-ACNC: <0.01 UIU/ML (ref 0.3–4.2)
VENTRICULAR RATE- MUSE: 193 BPM
WBC # BLD AUTO: 12.1 10E3/UL (ref 4–11)

## 2023-03-06 PROCEDURE — 85025 COMPLETE CBC W/AUTO DIFF WBC: CPT | Performed by: EMERGENCY MEDICINE

## 2023-03-06 PROCEDURE — 96361 HYDRATE IV INFUSION ADD-ON: CPT

## 2023-03-06 PROCEDURE — 96375 TX/PRO/DX INJ NEW DRUG ADDON: CPT | Performed by: EMERGENCY MEDICINE

## 2023-03-06 PROCEDURE — 74176 CT ABD & PELVIS W/O CONTRAST: CPT

## 2023-03-06 PROCEDURE — 258N000003 HC RX IP 258 OP 636: Performed by: EMERGENCY MEDICINE

## 2023-03-06 PROCEDURE — 71046 X-RAY EXAM CHEST 2 VIEWS: CPT | Mod: 26 | Performed by: RADIOLOGY

## 2023-03-06 PROCEDURE — 84439 ASSAY OF FREE THYROXINE: CPT | Performed by: EMERGENCY MEDICINE

## 2023-03-06 PROCEDURE — 71046 X-RAY EXAM CHEST 2 VIEWS: CPT

## 2023-03-06 PROCEDURE — 83605 ASSAY OF LACTIC ACID: CPT | Performed by: EMERGENCY MEDICINE

## 2023-03-06 PROCEDURE — 85610 PROTHROMBIN TIME: CPT | Performed by: EMERGENCY MEDICINE

## 2023-03-06 PROCEDURE — 96375 TX/PRO/DX INJ NEW DRUG ADDON: CPT

## 2023-03-06 PROCEDURE — 83690 ASSAY OF LIPASE: CPT | Performed by: EMERGENCY MEDICINE

## 2023-03-06 PROCEDURE — 99291 CRITICAL CARE FIRST HOUR: CPT | Mod: 25 | Performed by: EMERGENCY MEDICINE

## 2023-03-06 PROCEDURE — 96365 THER/PROPH/DIAG IV INF INIT: CPT

## 2023-03-06 PROCEDURE — 96374 THER/PROPH/DIAG INJ IV PUSH: CPT | Performed by: EMERGENCY MEDICINE

## 2023-03-06 PROCEDURE — 83735 ASSAY OF MAGNESIUM: CPT | Performed by: EMERGENCY MEDICINE

## 2023-03-06 PROCEDURE — 84484 ASSAY OF TROPONIN QUANT: CPT | Performed by: EMERGENCY MEDICINE

## 2023-03-06 PROCEDURE — 36415 COLL VENOUS BLD VENIPUNCTURE: CPT | Performed by: EMERGENCY MEDICINE

## 2023-03-06 PROCEDURE — 84443 ASSAY THYROID STIM HORMONE: CPT | Performed by: EMERGENCY MEDICINE

## 2023-03-06 PROCEDURE — 80053 COMPREHEN METABOLIC PANEL: CPT | Performed by: EMERGENCY MEDICINE

## 2023-03-06 PROCEDURE — 74176 CT ABD & PELVIS W/O CONTRAST: CPT | Mod: 26 | Performed by: RADIOLOGY

## 2023-03-06 PROCEDURE — 93005 ELECTROCARDIOGRAM TRACING: CPT | Performed by: EMERGENCY MEDICINE

## 2023-03-06 PROCEDURE — 83880 ASSAY OF NATRIURETIC PEPTIDE: CPT | Performed by: EMERGENCY MEDICINE

## 2023-03-06 PROCEDURE — 82010 KETONE BODYS QUAN: CPT | Performed by: EMERGENCY MEDICINE

## 2023-03-06 PROCEDURE — 96361 HYDRATE IV INFUSION ADD-ON: CPT | Performed by: EMERGENCY MEDICINE

## 2023-03-06 PROCEDURE — 99223 1ST HOSP IP/OBS HIGH 75: CPT | Mod: GC | Performed by: HOSPITALIST

## 2023-03-06 PROCEDURE — 250N000011 HC RX IP 250 OP 636: Performed by: EMERGENCY MEDICINE

## 2023-03-06 PROCEDURE — 83036 HEMOGLOBIN GLYCOSYLATED A1C: CPT | Performed by: STUDENT IN AN ORGANIZED HEALTH CARE EDUCATION/TRAINING PROGRAM

## 2023-03-06 PROCEDURE — 120N000002 HC R&B MED SURG/OB UMMC

## 2023-03-06 PROCEDURE — 85730 THROMBOPLASTIN TIME PARTIAL: CPT | Performed by: EMERGENCY MEDICINE

## 2023-03-06 PROCEDURE — 93010 ELECTROCARDIOGRAM REPORT: CPT | Performed by: EMERGENCY MEDICINE

## 2023-03-06 PROCEDURE — 250N000009 HC RX 250: Performed by: EMERGENCY MEDICINE

## 2023-03-06 RX ORDER — KETOROLAC TROMETHAMINE 15 MG/ML
15 INJECTION, SOLUTION INTRAMUSCULAR; INTRAVENOUS ONCE
Status: COMPLETED | OUTPATIENT
Start: 2023-03-06 | End: 2023-03-06

## 2023-03-06 RX ORDER — MORPHINE SULFATE 4 MG/ML
4 INJECTION, SOLUTION INTRAMUSCULAR; INTRAVENOUS ONCE
Status: COMPLETED | OUTPATIENT
Start: 2023-03-06 | End: 2023-03-06

## 2023-03-06 RX ORDER — ONDANSETRON 2 MG/ML
4 INJECTION INTRAMUSCULAR; INTRAVENOUS EVERY 30 MIN PRN
Status: DISCONTINUED | OUTPATIENT
Start: 2023-03-06 | End: 2023-03-07

## 2023-03-06 RX ORDER — SODIUM CHLORIDE 9 MG/ML
INJECTION, SOLUTION INTRAVENOUS CONTINUOUS
Status: DISCONTINUED | OUTPATIENT
Start: 2023-03-06 | End: 2023-03-07

## 2023-03-06 RX ORDER — MAGNESIUM SULFATE HEPTAHYDRATE 40 MG/ML
4 INJECTION, SOLUTION INTRAVENOUS ONCE
Status: COMPLETED | OUTPATIENT
Start: 2023-03-06 | End: 2023-03-07

## 2023-03-06 RX ORDER — METOPROLOL TARTRATE 1 MG/ML
5 INJECTION, SOLUTION INTRAVENOUS ONCE
Status: COMPLETED | OUTPATIENT
Start: 2023-03-06 | End: 2023-03-06

## 2023-03-06 RX ORDER — DILTIAZEM HYDROCHLORIDE 5 MG/ML
10 INJECTION INTRAVENOUS ONCE
Status: COMPLETED | OUTPATIENT
Start: 2023-03-06 | End: 2023-03-06

## 2023-03-06 RX ADMIN — SODIUM CHLORIDE: 9 INJECTION, SOLUTION INTRAVENOUS at 21:35

## 2023-03-06 RX ADMIN — METOPROLOL TARTRATE 5 MG: 5 INJECTION INTRAVENOUS at 22:11

## 2023-03-06 RX ADMIN — SODIUM CHLORIDE 1000 ML: 9 INJECTION, SOLUTION INTRAVENOUS at 20:00

## 2023-03-06 RX ADMIN — DILTIAZEM HYDROCHLORIDE 10 MG: 5 INJECTION INTRAVENOUS at 21:53

## 2023-03-06 RX ADMIN — ONDANSETRON 4 MG: 2 INJECTION INTRAMUSCULAR; INTRAVENOUS at 19:44

## 2023-03-06 RX ADMIN — MORPHINE SULFATE 4 MG: 4 INJECTION INTRAVENOUS at 20:42

## 2023-03-06 RX ADMIN — MAGNESIUM SULFATE IN WATER 4 G: 40 INJECTION, SOLUTION INTRAVENOUS at 22:45

## 2023-03-06 RX ADMIN — KETOROLAC TROMETHAMINE 15 MG: 15 INJECTION, SOLUTION INTRAMUSCULAR; INTRAVENOUS at 20:41

## 2023-03-06 ASSESSMENT — ACTIVITIES OF DAILY LIVING (ADL)
ADLS_ACUITY_SCORE: 35
ADLS_ACUITY_SCORE: 35

## 2023-03-07 ENCOUNTER — APPOINTMENT (OUTPATIENT)
Dept: CARDIOLOGY | Facility: CLINIC | Age: 63
End: 2023-03-07
Attending: STUDENT IN AN ORGANIZED HEALTH CARE EDUCATION/TRAINING PROGRAM
Payer: COMMERCIAL

## 2023-03-07 LAB
ALBUMIN SERPL BCG-MCNC: 3.7 G/DL (ref 3.5–5.2)
ALP SERPL-CCNC: 62 U/L (ref 35–104)
ALT SERPL W P-5'-P-CCNC: 28 U/L (ref 10–35)
ANION GAP SERPL CALCULATED.3IONS-SCNC: 12 MMOL/L (ref 7–15)
APTT PPP: 28 SECONDS (ref 22–38)
AST SERPL W P-5'-P-CCNC: 29 U/L (ref 10–35)
ATRIAL RATE - MUSE: 74 BPM
B-OH-BUTYR SERPL-SCNC: <0.18 MMOL/L
BASOPHILS # BLD AUTO: 0 10E3/UL (ref 0–0.2)
BASOPHILS NFR BLD AUTO: 0 %
BILIRUB SERPL-MCNC: 0.3 MG/DL
BUN SERPL-MCNC: 18.1 MG/DL (ref 8–23)
CALCIUM SERPL-MCNC: 8.4 MG/DL (ref 8.8–10.2)
CHLORIDE SERPL-SCNC: 104 MMOL/L (ref 98–107)
CREAT SERPL-MCNC: 0.6 MG/DL (ref 0.51–0.95)
DEPRECATED HCO3 PLAS-SCNC: 21 MMOL/L (ref 22–29)
DIASTOLIC BLOOD PRESSURE - MUSE: NORMAL MMHG
EOSINOPHIL # BLD AUTO: 0 10E3/UL (ref 0–0.7)
EOSINOPHIL NFR BLD AUTO: 1 %
ERYTHROCYTE [DISTWIDTH] IN BLOOD BY AUTOMATED COUNT: 12.1 % (ref 10–15)
GFR SERPL CREATININE-BSD FRML MDRD: >90 ML/MIN/1.73M2
GLUCOSE SERPL-MCNC: 125 MG/DL (ref 70–99)
HBA1C MFR BLD: 6.4 %
HCT VFR BLD AUTO: 38.8 % (ref 35–47)
HGB BLD-MCNC: 12.2 G/DL (ref 11.7–15.7)
IMM GRANULOCYTES # BLD: 0 10E3/UL
IMM GRANULOCYTES NFR BLD: 0 %
INTERPRETATION ECG - MUSE: NORMAL
LVEF ECHO: NORMAL
LYMPHOCYTES # BLD AUTO: 0.8 10E3/UL (ref 0.8–5.3)
LYMPHOCYTES NFR BLD AUTO: 15 %
MAGNESIUM SERPL-MCNC: 3.6 MG/DL (ref 1.7–2.3)
MCH RBC QN AUTO: 29.1 PG (ref 26.5–33)
MCHC RBC AUTO-ENTMCNC: 31.4 G/DL (ref 31.5–36.5)
MCV RBC AUTO: 93 FL (ref 78–100)
MONOCYTES # BLD AUTO: 0.8 10E3/UL (ref 0–1.3)
MONOCYTES NFR BLD AUTO: 16 %
NEUTROPHILS # BLD AUTO: 3.5 10E3/UL (ref 1.6–8.3)
NEUTROPHILS NFR BLD AUTO: 68 %
NRBC # BLD AUTO: 0 10E3/UL
NRBC BLD AUTO-RTO: 0 /100
P AXIS - MUSE: 66 DEGREES
PLATELET # BLD AUTO: 288 10E3/UL (ref 150–450)
POTASSIUM SERPL-SCNC: 3.7 MMOL/L (ref 3.4–5.3)
PR INTERVAL - MUSE: 194 MS
PROT SERPL-MCNC: 6.4 G/DL (ref 6.4–8.3)
QRS DURATION - MUSE: 62 MS
QT - MUSE: 424 MS
QTC - MUSE: 470 MS
R AXIS - MUSE: 12 DEGREES
RBC # BLD AUTO: 4.19 10E6/UL (ref 3.8–5.2)
SARS-COV-2 RNA RESP QL NAA+PROBE: NEGATIVE
SODIUM SERPL-SCNC: 137 MMOL/L (ref 136–145)
SYSTOLIC BLOOD PRESSURE - MUSE: NORMAL MMHG
T AXIS - MUSE: 12 DEGREES
VENTRICULAR RATE- MUSE: 74 BPM
WBC # BLD AUTO: 5.2 10E3/UL (ref 4–11)

## 2023-03-07 PROCEDURE — 85025 COMPLETE CBC W/AUTO DIFF WBC: CPT | Performed by: STUDENT IN AN ORGANIZED HEALTH CARE EDUCATION/TRAINING PROGRAM

## 2023-03-07 PROCEDURE — G0378 HOSPITAL OBSERVATION PER HR: HCPCS

## 2023-03-07 PROCEDURE — 250N000013 HC RX MED GY IP 250 OP 250 PS 637: Performed by: STUDENT IN AN ORGANIZED HEALTH CARE EDUCATION/TRAINING PROGRAM

## 2023-03-07 PROCEDURE — 96366 THER/PROPH/DIAG IV INF ADDON: CPT

## 2023-03-07 PROCEDURE — 93306 TTE W/DOPPLER COMPLETE: CPT | Mod: 26 | Performed by: STUDENT IN AN ORGANIZED HEALTH CARE EDUCATION/TRAINING PROGRAM

## 2023-03-07 PROCEDURE — 96361 HYDRATE IV INFUSION ADD-ON: CPT

## 2023-03-07 PROCEDURE — 258N000003 HC RX IP 258 OP 636: Performed by: STUDENT IN AN ORGANIZED HEALTH CARE EDUCATION/TRAINING PROGRAM

## 2023-03-07 PROCEDURE — 36415 COLL VENOUS BLD VENIPUNCTURE: CPT | Performed by: STUDENT IN AN ORGANIZED HEALTH CARE EDUCATION/TRAINING PROGRAM

## 2023-03-07 PROCEDURE — 83735 ASSAY OF MAGNESIUM: CPT | Performed by: STUDENT IN AN ORGANIZED HEALTH CARE EDUCATION/TRAINING PROGRAM

## 2023-03-07 PROCEDURE — 250N000011 HC RX IP 250 OP 636: Performed by: STUDENT IN AN ORGANIZED HEALTH CARE EDUCATION/TRAINING PROGRAM

## 2023-03-07 PROCEDURE — 93306 TTE W/DOPPLER COMPLETE: CPT

## 2023-03-07 PROCEDURE — 96376 TX/PRO/DX INJ SAME DRUG ADON: CPT

## 2023-03-07 PROCEDURE — 250N000013 HC RX MED GY IP 250 OP 250 PS 637: Performed by: HOSPITALIST

## 2023-03-07 PROCEDURE — 82010 KETONE BODYS QUAN: CPT | Performed by: STUDENT IN AN ORGANIZED HEALTH CARE EDUCATION/TRAINING PROGRAM

## 2023-03-07 PROCEDURE — U0003 INFECTIOUS AGENT DETECTION BY NUCLEIC ACID (DNA OR RNA); SEVERE ACUTE RESPIRATORY SYNDROME CORONAVIRUS 2 (SARS-COV-2) (CORONAVIRUS DISEASE [COVID-19]), AMPLIFIED PROBE TECHNIQUE, MAKING USE OF HIGH THROUGHPUT TECHNOLOGIES AS DESCRIBED BY CMS-2020-01-R: HCPCS | Performed by: HOSPITALIST

## 2023-03-07 PROCEDURE — C9803 HOPD COVID-19 SPEC COLLECT: HCPCS | Performed by: EMERGENCY MEDICINE

## 2023-03-07 PROCEDURE — 99233 SBSQ HOSP IP/OBS HIGH 50: CPT | Performed by: HOSPITALIST

## 2023-03-07 PROCEDURE — 80053 COMPREHEN METABOLIC PANEL: CPT | Performed by: STUDENT IN AN ORGANIZED HEALTH CARE EDUCATION/TRAINING PROGRAM

## 2023-03-07 RX ORDER — LIDOCAINE 40 MG/G
CREAM TOPICAL
Status: DISCONTINUED | OUTPATIENT
Start: 2023-03-07 | End: 2023-03-08 | Stop reason: HOSPADM

## 2023-03-07 RX ORDER — ACETAMINOPHEN 325 MG/1
650 TABLET ORAL EVERY 6 HOURS PRN
Status: DISCONTINUED | OUTPATIENT
Start: 2023-03-07 | End: 2023-03-08 | Stop reason: HOSPADM

## 2023-03-07 RX ORDER — ONDANSETRON 2 MG/ML
4 INJECTION INTRAMUSCULAR; INTRAVENOUS EVERY 6 HOURS PRN
Status: DISCONTINUED | OUTPATIENT
Start: 2023-03-07 | End: 2023-03-07

## 2023-03-07 RX ORDER — ONDANSETRON 4 MG/1
4 TABLET, FILM COATED ORAL EVERY 6 HOURS PRN
Status: DISCONTINUED | OUTPATIENT
Start: 2023-03-07 | End: 2023-03-08 | Stop reason: HOSPADM

## 2023-03-07 RX ORDER — ONDANSETRON 4 MG/1
4 TABLET, ORALLY DISINTEGRATING ORAL EVERY 6 HOURS PRN
Status: DISCONTINUED | OUTPATIENT
Start: 2023-03-07 | End: 2023-03-07

## 2023-03-07 RX ORDER — ACETAMINOPHEN 650 MG/1
650 SUPPOSITORY RECTAL EVERY 6 HOURS PRN
Status: DISCONTINUED | OUTPATIENT
Start: 2023-03-07 | End: 2023-03-08 | Stop reason: HOSPADM

## 2023-03-07 RX ORDER — POLYETHYLENE GLYCOL 3350 17 G/17G
17 POWDER, FOR SOLUTION ORAL DAILY PRN
Status: DISCONTINUED | OUTPATIENT
Start: 2023-03-07 | End: 2023-03-08 | Stop reason: HOSPADM

## 2023-03-07 RX ORDER — PROCHLORPERAZINE MALEATE 5 MG
10 TABLET ORAL EVERY 6 HOURS PRN
Status: DISCONTINUED | OUTPATIENT
Start: 2023-03-07 | End: 2023-03-08 | Stop reason: HOSPADM

## 2023-03-07 RX ORDER — DILTIAZEM HYDROCHLORIDE 30 MG/1
30 TABLET, FILM COATED ORAL 4 TIMES DAILY
Status: DISCONTINUED | OUTPATIENT
Start: 2023-03-07 | End: 2023-03-08 | Stop reason: HOSPADM

## 2023-03-07 RX ORDER — PROCHLORPERAZINE 25 MG
25 SUPPOSITORY, RECTAL RECTAL EVERY 12 HOURS PRN
Status: DISCONTINUED | OUTPATIENT
Start: 2023-03-07 | End: 2023-03-08 | Stop reason: HOSPADM

## 2023-03-07 RX ADMIN — ACETAMINOPHEN 650 MG: 325 TABLET ORAL at 22:46

## 2023-03-07 RX ADMIN — DEXTROSE AND SODIUM CHLORIDE: 5; 900 INJECTION, SOLUTION INTRAVENOUS at 02:02

## 2023-03-07 RX ADMIN — ONDANSETRON 4 MG: 2 INJECTION INTRAMUSCULAR; INTRAVENOUS at 02:07

## 2023-03-07 RX ADMIN — Medication 1 MG: at 03:19

## 2023-03-07 RX ADMIN — DILTIAZEM HYDROCHLORIDE 30 MG: 30 TABLET, FILM COATED ORAL at 23:39

## 2023-03-07 RX ADMIN — DILTIAZEM HYDROCHLORIDE 30 MG: 30 TABLET, FILM COATED ORAL at 17:40

## 2023-03-07 RX ADMIN — DILTIAZEM HYDROCHLORIDE 30 MG: 30 TABLET, FILM COATED ORAL at 13:14

## 2023-03-07 ASSESSMENT — ACTIVITIES OF DAILY LIVING (ADL)
ADLS_ACUITY_SCORE: 35

## 2023-03-07 NOTE — PROGRESS NOTES
[facility]    Medicine Progress Note - Hospitalist Service, GOLD TEAM 12    Date of Admission:  3/6/2023    Assessment & Plan      Andree Garcia is a 62 year old woman with a history of hypothyroidism, anxiety, hyperlipidemia and palpitations who presents with atrial fibrillation with rapid ventricular rhythm admitted for further workup and management.     Atrial fibrillation with rapid ventricular response   She was seen in cardiology clinic 3/2/2023 for palpitations and was put on an event monitor which noted her to be in atrial fibrillation with rates in 170s. Chronicity of atrial fibrillation unclear since it is the first time it was discovered on EKG. Prior EKGs with NSR. CHADSVASc 1 for sex.   - RVR triggers: hyperthyroidism, hypovolemia, moderate etoh (recent party a few days ago). Counseled on these triggers and their avoidance  - TTE unremarkable  - messages w/ treating cardiology team 3/7: agree w/ jayne blocker to protect against recurrent RVR, and EP referral (placed)  - diltiazem 30mg QID (3/7 - )    Hyperthyroidism due to excesss upplementation, hx of hypothyrodism   - dose reduce NP thyroid 90 --> 75 for discharge, f/u TSH 6 weeks     Nausea/vomiting 2/2 acute gastroenteritis  Non-MI troponin elevation 2/2 hypovolemia  Suspect she is having an episode of gastroenteritis since her  as well has the same symptoms and they ate similar food.  She has not been eating much over the last few days and has been having nausea and vomiting.    - s/p fluids  - Zofran prn for nausea     Anxiety - hold PTA propranolol   Menopausal symptoms - hold PTA estradiol and progesterone       Diet: Combination Diet Low Saturated Fat Na <2400mg Diet, No Caffeine Diet    DVT Prophylaxis: Ambulate every shift  Marsh Catheter: Not present  Lines: None     Cardiac Monitoring: ACTIVE order. Indication: Tachyarrhythmias, acute (48 hours)  Code Status: Full Code      Clinically  Significant Risk Factors Present on Admission          # Hypocalcemia: Lowest Ca = 8.4 mg/dL in last 2 days, will monitor and replace as appropriate  # Hypercalcemia: Highest Ca = 10.5 mg/dL in last 2 days, will monitor as appropriate  # Hypomagnesemia: Lowest Mg = 1.4 mg/dL in last 2 days, will replace as needed  # Anion Gap Metabolic Acidosis: Highest Anion Gap = 22 mmol/L in last 2 days, will monitor and treat as appropriate                   Disposition Plan      Expected Discharge Date: 03/08/2023                  Juve Ulloa MD  Hospitalist Service, GOLD TEAM 12  [facility]  Securely message with [employer] (more info)  Text page via [facility] Paging/Directory   See signed in provider for up to date coverage information  ______________________________________________________________________    Interval History   By afternoon able to drink a glass of gatorade w/o vomiting, still no slid foood. No chest pain or shortness of breath.     4 point ROS otherwise negative.     Physical Exam   Vital Signs: Temp: 98.1  F (36.7  C) Temp src: Oral BP: 110/58 Pulse: 79   Resp: 18 SpO2: 96 % O2 Device: None (Room air)    Weight: 0 lbs 0 oz      Physical Exam   Constitutional:  NAD  HEENT: EOMI  Neck: Symmetric  Cardiovascular: regaular without murmurs or gallops  Pulmonary/Chest: CTAB. No respiratory distress.  GI: Soft, non-tender , non-distended    Musculoskeletal:  No lower extremity edema   Skin: Skin is warm and dry  Neurological: Alert and oriented   Psychiatric:  euthymic      Medical Decision Making       65 MINUTES SPENT BY ME on the date of service doing chart review, history, exam, documentation & further activities per the note.      Data   ------------------------- PAST 24 HR DATA REVIEWED -----------------------------------------------    I have personally reviewed the following data over the past 24 hrs:    5.2  \   12.2   / 288     137 104 18.1 /  125 (H)   3.7 21  (L) 0.60 \       ALT: 28 AST: 29 AP: 62 TBILI: 0.3   ALB: 3.7 TOT PROTEIN: 6.4 LIPASE: 29       Trop: 20 (H); 19 (H) BNP: 1,174 (H)       TSH: <0.01 (L) T4: 2.32 (H) A1C: 6.4 (H)       Procal: N/A CRP: N/A Lactic Acid: 0.9       INR:  1.00 PTT:  28   D-dimer:  N/A Fibrinogen:  N/A       Imaging results reviewed over the past 24 hrs:   Recent Results (from the past 24 hour(s))   XR Chest 2 Views    Narrative    EXAM: XR CHEST 2 VIEWS  3/6/2023 8:09 PM      HISTORY: palpitations    COMPARISON: None    FINDINGS: Two views of the chest. No pleural effusion. No  pneumothorax. Normal cardiac silhouette. No acute airspace opacities.  No aggressive osseous abnormalities. Visualized upper abdomen is  unremarkable.      Impression    IMPRESSION:  Clear chest    I have personally reviewed the examination and initial interpretation  and I agree with the findings.    WEI FARRELL MD         SYSTEM ID:  [id number]   CT Abdomen Pelvis w/o Contrast    Narrative    EXAMINATION: CT ABDOMEN PELVIS W/O CONTRAST, 3/6/2023 9:26 PM    INDICATION: L flank pain    COMPARISON STUDY: None    TECHNIQUE: CT scan of the abdomen and pelvis was performed on  multidetector CT scanner using volumetric acquisition technique and  images were reconstructed in multiple planes with variable thickness  and reviewed on dedicated workstations.     CONTRAST: None    CT scan radiation dose is optimized to minimum requisite dose using  automated dose modulation techniques.    FINDINGS:    Lower thorax: Bibasilar atelectasis.    Liver: No mass. No intrahepatic biliary ductal dilation.  Diffuse  hepatic steatosis    Biliary System: Normal gallbladder. No extrahepatic biliary ductal  dilation.    Pancreas: No mass or pancreatic ductal dilation.    Adrenal glands: No mass or nodules    Spleen: Normal. Splenule inferior to the spleen.    Kidneys: No suspicious mass, obstructing calculus or hydronephrosis.    Gastrointestinal tract :Normal appendix. Normal  caliber small bowel. A  few scattered colonic diverticula in the sigmoid region present with  no evidence of acute diverticulitis. Prominent fatty ileocecal valve.  There is also some submucosal fatty deposition in the terminal ileum  and ascending colon which is nonspecific but can be seen in prior  inflammation. No acute inflammatory changes.    Mesentery/peritoneum/retroperitoneum: No mass. No free fluid or air.    Lymph nodes: No significant lymphadenopathy.    Vasculature: Patent major abdominal vasculature.    Pelvis: Urinary bladder is normal.  Cystic area in the cervix  consistent with a nabothian cyst. Otherwise normal noncontrast  appearance of the uterus and ovaries.    Osseous structures: No aggressive or acute osseous lesion.  Mild  degenerative changes of the lumbar spine.      Soft tissues: Within normal limits.      Impression    IMPRESSION:   1. No acute findings in the abdomen or pelvis.  2. Hepatic steatosis.    I have personally reviewed the examination and initial interpretation  and I agree with the findings.    WEI FARRELL MD         SYSTEM ID:  [id number]   Echo Complete   Result Value    LVEF  55-60%    Narrative    [id number]  [id number]  [id number]  [id number]^ARCHIE^BRENT     [facility]  Echocardiography Laboratory  [address] [place] [id number]     Name: MOUNIKA LOPEZ  MRN: [id number]  : 1960  Study Date: 2023 08:54 AM  Age: 62 yrs  Gender: Female  Patient Location: [facility]  Reason For Study: Atrial Fibrillation  Ordering Physician: BRENT ALMANZA  Performed By: Jerod Levine     BSA: 1.8 m2  Height: 66 in  Weight: 154 lb  HR: 83  BP: 113/53 mmHg  ______________________________________________________________________________  Procedure  Echocardiogram with two-dimensional, color and spectral Doppler performed.  ______________________________________________________________________________  Interpretation Summary  Patient in  sinus rhythm during exam.  Global and regional left ventricular function is normal with an EF of 55-60%.  Global right ventricular function is normal. The right ventricle is normal  size.  No significant valvular abnormalities.  The estimated PA systolic pressure is 43 mmHg.  IVC diameter and respiratory changes fall into an intermediate range  suggesting an RA pressure of 8 mmHg.  There is no prior study for direct comparison.  ______________________________________________________________________________  Left Ventricle  Global and regional left ventricular function is normal with an EF of 55-60%.  Left ventricular wall thickness is normal. Left ventricular size is normal.  Left ventricular diastolic function is normal.     Right Ventricle  Global right ventricular function is normal. The right ventricle is normal  size.     Atria  Both atria appear normal.     Mitral Valve  The mitral valve is normal. Trace mitral insufficiency is present.     Aortic Valve  The aortic valve is tricuspid. On Doppler interrogation, there is no  significant stenosis or regurgitation.     Tricuspid Valve  Mild tricuspid insufficiency is present. The right ventricular systolic  pressure is approximated at 35.2 mmHg plus the right atrial pressure.     Pulmonic Valve  The valve leaflets are not well visualized. Trace pulmonic insufficiency is  present.     Vessels  Sinuses of Valsalva 2.8 cm. Ascending aorta 3.0 cm. IVC diameter and  respiratory changes fall into an intermediate range suggesting an RA pressure  of 8 mmHg.     Pericardium  No pericardial effusion is present.     Compared to Previous Study  There is no prior study for direct comparison.  ______________________________________________________________________________  MMode/2D Measurements & Calculations     IVSd: 0.85 cm  LVIDd: 5.0 cm  LVIDs: 2.8 cm  LVPWd: 0.60 cm  FS: 43.2 %  LV mass(C)d: 120.0 grams  LV mass(C)dI: 67.1 grams/m2  Ao root diam: 2.8 cm  asc Aorta Diam:  3.0 cm  LVOT diam: 1.8 cm  LVOT area: 2.6 cm2  LA Volume (BP): 42.5 ml  LA Volume Index (BP): 23.7 ml/m2  RWT: 0.24     Doppler Measurements & Calculations  MV E max gisela: 69.2 cm/sec  MV A max gisela: 65.0 cm/sec  MV E/A: 1.1  MV dec slope: 377.8 cm/sec2  MV dec time: 0.18 sec  PA acc time: 0.08 sec  TR max gisela: 296.4 cm/sec  TR max P.2 mmHg  E/E' av.1  Lateral E/e': 5.4  Medial E/e': 8.7     ______________________________________________________________________________  Report approved by: Malik Francois 2023 10:26 AM

## 2023-03-07 NOTE — UTILIZATION REVIEW
"Admission Status; Secondary Review Determination     Under the authority of the Utilization Management Committee, the utilization review process indicated a secondary review on the above patient. The review outcome is based on review of the medical records, discussions with staff, and applying clinical experience noted on the date of the review.     (x) Observation Status Appropriate - This patient does not meet hospital inpatient criteria and is placed in observation status. If this patient's primary payer is Medicare and was admitted as an inpatient, Condition Code 44 should be used and patient status changed to \"observation\".     RATIONALE FOR DETERMINATION:    62 year old woman who presented with concerns of heart rhythm, abdominal pain, diarrhea and nausea.  The patient was found to have atrial fibrillation with RVR and presumed gastroenteritis    VS notable for initial HR near 170; tachycardia resolved shortly after presentation and heart rate remains normal.  No fever.  No hypoxia    Labs notable for AG acidosis and undetectable TSH with elevated free T4 level in the setting of hypothyroid supplementation    TTE shows normal LV ftn    CT abdomen shows no acute findings    Given resolution of tachycardia shortly after presentation to the ER with otherwise stable vital signs and a presumed diagnosis of gastroenteritis, observation status appears most appropriate at this time.  If the patient's clinical status worsens and/or she is unable to discharge within the next 48 hours, would reassess for possible inpatient status.      The severity of illness, intensity of service provided, expected LOS and risk for adverse outcome make the care appropriate for further observation; however, doesn't meet criteria for hospital inpatient admission. Dr Ulloa notified of this determination via text message through [employer] system today    The information on this document is developed by the utilization review team in order for " the business office to ensure compliance. This only denotes the appropriateness of proper admission status and does not reflect the quality of care rendered.   The definitions of Inpatient Status and Observation Status used in making the determination above are those provided in the CMS Coverage Manual, Chapter 1 and Chapter 6, section 70.4.     Sincerely,     Dwight Manriquez MD  Utilization Review   Physician Advisor   [facility]

## 2023-03-07 NOTE — ED PROVIDER NOTES
History     Chief Complaint   Patient presents with     Abdominal Pain     Nausea, Vomiting, & Diarrhea     Came as per cardiologist per her heart monitor saying she is having Afib -RVR . Pt also reports of  abdominal pain , diarrhea and nausea and vomiting that started last night.     TRICIA Garcia is a 62 year old female with a past medical history of atrial fibrillation, anxiety, costochondritis, hyperlipidemia, Lyme disease who presents to the emergency department with a chief complaint of cardiac arrhythmia. The patient reports that she has been having more frequent episodes of atrial fibrillation recently and her doctor set her up with cardiac monitor. She was told to come in d/t elevated HR. HR 180s-190s on arrival to ER. Pt denies CP or SOB, but is c/o severe L sided flank/abdominal pain, constant but waxes and wanes. A/w diarrhea, nausea/vomiting. No fevers or urinary symptoms. She has a family history of kidney stones, no personal history.     I have reviewed the Medications, Allergies, Past Medical and Surgical History, and Social History in the [employer] system.    Past Medical History:   Diagnosis Date     Anxiety     developed with menopause     Chest pain     non cardiac     Costochondritis      Hyperlipidemia      Lyme disease      Past Surgical History:   Procedure Laterality Date     COLONOSCOPY  9/11/2012    Procedure: COLONOSCOPY;  COLONOSCOPY,  ESOPHAGOSCOPY, GASTROSCOPY, DUODENOSCOPY;  Surgeon: Dominic Gray MD;  Location:  GI     DILATION AND CURETTAGE       ESOPHAGOSCOPY, GASTROSCOPY, DUODENOSCOPY (EGD), COMBINED  9/11/2012    Procedure: COMBINED ESOPHAGOSCOPY, GASTROSCOPY, DUODENOSCOPY (EGD);;  Surgeon: Dominic Gray MD;  Location:  GI     EXCISE CONDYLOMA RECTUM       uterine polypectomy       No current facility-administered medications for this encounter.     Current Outpatient Medications   Medication     Cholecalciferol (VITAMIN D3)     estradiol (ESTRACE VAGINAL) 0.1 MG/GM  vaginal cream     estradiol (ESTRACE VAGINAL) 0.1 MG/GM vaginal cream     estradiol (ESTRACE) 1 MG tablet     Multiple Vitamin (MULTIVITAMIN OR)     NP THYROID 90 MG tablet     Omega-3 Fatty Acids (OMEGA III EPA+DHA PO)     progesterone (PROMETRIUM) 100 MG capsule     progesterone (PROMETRIUM) 100 MG capsule     propranolol (INDERAL) 10 MG tablet     VIIBRYD 40 MG TABS tablet     Allergies   Allergen Reactions     Para Aminobenzoate [Aminobenzoate]      Sulfa Drugs      Hives     Past medical history, past surgical history, medications, and allergies were reviewed with the patient. Additional pertinent items: None    Social History     Socioeconomic History     Marital status:      Spouse name: Not on file     Number of children: Not on file     Years of education: Not on file     Highest education level: Not on file   Occupational History     Not on file   Tobacco Use     Smoking status: Never     Smokeless tobacco: Never   Substance and Sexual Activity     Alcohol use: Yes     Comment: social - maybe 3 times week     Drug use: No     Sexual activity: Yes     Partners: Male   Other Topics Concern     Parent/sibling w/ CABG, MI or angioplasty before 65F 55M? Not Asked      Service Not Asked     Blood Transfusions Not Asked     Caffeine Concern Yes     Comment: 1/2 cup of coffee      Occupational Exposure Not Asked     Hobby Hazards Not Asked     Sleep Concern No     Stress Concern No     Weight Concern Not Asked     Special Diet Yes     Comment: whole foods, healthy      Back Care Not Asked     Exercise Yes     Comment: walking, yoga      Bike Helmet Not Asked     Seat Belt Yes     Self-Exams Not Asked   Social History Narrative     Not on file     Social Determinants of Health     Financial Resource Strain: Not on file   Food Insecurity: Not on file   Transportation Needs: Not on file   Physical Activity: Not on file   Stress: Not on file   Social Connections: Not on file   Intimate Partner Violence:  Not on file   Housing Stability: Not on file     Social history was reviewed with the patient. Additional pertinent items: None    Review of Systems  A medically appropriate review of systems was performed with pertinent positives and negatives noted in the HPI, and all other systems negative.    Physical Exam          General: Well nourished, well developed, NAD  HEENT: EOMI, anicteric. NCAT, MMM  Neck: no jugular venous distension, supple, nl ROM  Cardiac: Regular rate and rhythm. No murmurs, rubs, or gallops. Normal S1, S2.  Intact peripheral pulses  Pulm: CTAB, no stridor, wheezes, rales, rhonchi  Abd: Soft, nontender, nondistended.  No masses palpated.    Skin: Warm and dry to the touch.  No rash  Extremities: No LE edema, no cyanosis, w/w/p  Neuro: A&Ox3, no gross focal deficits    ED Course        Procedures                 ED Course Selections:        EKG Interpretation:      Interpreted by Sigrid Buitrago MD  Time reviewed: 1952  Symptoms at time of EKG: tachycardia   Rhythm: atrial fibrillation  Rate: 193 bpm, tachycardic  Axis: normal  Ectopy: none  Conduction: normal  ST Segments/ T Waves: Nonspecific ST abnormality  Q Waves: none  Comparison to prior: compared to EKG dated 3/2/23, the atrial fibrillation and RVR are new    Clinical Impression: abnormal EKG                Labs Ordered and Resulted from Time of ED Arrival to Time of ED Departure   COMPREHENSIVE METABOLIC PANEL - Abnormal       Result Value    Sodium 142      Potassium 4.1      Chloride 104      Carbon Dioxide (CO2) 16 (*)     Anion Gap 22 (*)     Urea Nitrogen 22.5      Creatinine 0.72      Calcium 10.5 (*)     Glucose 212 (*)     Alkaline Phosphatase 89      AST 27      ALT 34      Protein Total 8.6 (*)     Albumin 4.7      Bilirubin Total 0.6      GFR Estimate >90     MAGNESIUM - Abnormal    Magnesium 1.4 (*)    TSH WITH FREE T4 REFLEX - Abnormal    TSH <0.01 (*)    TROPONIN T, HIGH SENSITIVITY - Abnormal    Troponin T, High  Sensitivity 20 (*)    NT PROBNP INPATIENT - Abnormal    N terminal Pro BNP Inpatient 1,174 (*)    CBC WITH PLATELETS AND DIFFERENTIAL - Abnormal    WBC Count 12.1 (*)     RBC Count 5.19      Hemoglobin 15.3      Hematocrit 46.3      MCV 89      MCH 29.5      MCHC 33.0      RDW 11.9      Platelet Count 462 (*)     % Neutrophils 88      % Lymphocytes 5      % Monocytes 6      % Eosinophils 1      % Basophils 0      % Immature Granulocytes 0      NRBCs per 100 WBC 0      Absolute Neutrophils 10.7 (*)     Absolute Lymphocytes 0.6 (*)     Absolute Monocytes 0.7      Absolute Eosinophils 0.1      Absolute Basophils 0.0      Absolute Immature Granulocytes 0.0      Absolute NRBCs 0.0     T4 FREE - Abnormal    Free T4 2.32 (*)    TROPONIN T, HIGH SENSITIVITY - Abnormal    Troponin T, High Sensitivity 19 (*)    KETONE BETA-HYDROXYBUTYRATE QUANTITATIVE, RAPID - Abnormal    Ketone (Beta-Hydroxybutyrate) Quantitative 1.30 (*)    LIPASE - Normal    Lipase 29     INR - Normal    INR 1.00     LACTIC ACID WHOLE BLOOD   PARTIAL THROMBOPLASTIN TIME            Results for orders placed or performed during the hospital encounter of 03/06/23 (from the past 24 hour(s))   CBC with platelets differential    Narrative    The following orders were created for panel order CBC with platelets differential.  Procedure                               Abnormality         Status                     ---------                               -----------         ------                     CBC with platelets and d...[[id number]]  Abnormal            Final result                 Please view results for these tests on the individual orders.   Comprehensive metabolic panel   Result Value Ref Range    Sodium 142 136 - 145 mmol/L    Potassium 4.1 3.4 - 5.3 mmol/L    Chloride 104 98 - 107 mmol/L    Carbon Dioxide (CO2) 16 (L) 22 - 29 mmol/L    Anion Gap 22 (H) 7 - 15 mmol/L    Urea Nitrogen 22.5 8.0 - 23.0 mg/dL    Creatinine 0.72 0.51 - 0.95 mg/dL    Calcium 10.5  (H) 8.8 - 10.2 mg/dL    Glucose 212 (H) 70 - 99 mg/dL    Alkaline Phosphatase 89 35 - 104 U/L    AST 27 10 - 35 U/L    ALT 34 10 - 35 U/L    Protein Total 8.6 (H) 6.4 - 8.3 g/dL    Albumin 4.7 3.5 - 5.2 g/dL    Bilirubin Total 0.6 <=1.2 mg/dL    GFR Estimate >90 >60 mL/min/1.73m2   Lipase   Result Value Ref Range    Lipase 29 13 - 60 U/L   Magnesium   Result Value Ref Range    Magnesium 1.4 (L) 1.7 - 2.3 mg/dL   TSH with free T4 reflex   Result Value Ref Range    TSH <0.01 (L) 0.30 - 4.20 uIU/mL   Troponin T, High Sensitivity   Result Value Ref Range    Troponin T, High Sensitivity 20 (H) <=14 ng/L   Nt probnp inpatient (BNP)   Result Value Ref Range    N terminal Pro BNP Inpatient 1,174 (H) 0 - 900 pg/mL   CBC with platelets and differential   Result Value Ref Range    WBC Count 12.1 (H) 4.0 - 11.0 10e3/uL    RBC Count 5.19 3.80 - 5.20 10e6/uL    Hemoglobin 15.3 11.7 - 15.7 g/dL    Hematocrit 46.3 35.0 - 47.0 %    MCV 89 78 - 100 fL    MCH 29.5 26.5 - 33.0 pg    MCHC 33.0 31.5 - 36.5 g/dL    RDW 11.9 10.0 - 15.0 %    Platelet Count 462 (H) 150 - 450 10e3/uL    % Neutrophils 88 %    % Lymphocytes 5 %    % Monocytes 6 %    % Eosinophils 1 %    % Basophils 0 %    % Immature Granulocytes 0 %    NRBCs per 100 WBC 0 <1 /100    Absolute Neutrophils 10.7 (H) 1.6 - 8.3 10e3/uL    Absolute Lymphocytes 0.6 (L) 0.8 - 5.3 10e3/uL    Absolute Monocytes 0.7 0.0 - 1.3 10e3/uL    Absolute Eosinophils 0.1 0.0 - 0.7 10e3/uL    Absolute Basophils 0.0 0.0 - 0.2 10e3/uL    Absolute Immature Granulocytes 0.0 <=0.4 10e3/uL    Absolute NRBCs 0.0 10e3/uL   Extra Tube    Narrative    The following orders were created for panel order Extra Tube.  Procedure                               Abnormality         Status                     ---------                               -----------         ------                     Extra Blue Top Tube[[id number]]                              Final result               Extra Red Top Tube[[id number]]                                Final result               Extra Green Top (Lithium...[[id number]]                      Final result               Extra Purple Top Tube[[id number]]                            Final result                 Please view results for these tests on the individual orders.   Extra Blue Top Tube   Result Value Ref Range    Hold Specimen JIC    Extra Red Top Tube   Result Value Ref Range    Hold Specimen JIC    Extra Green Top (Lithium Heparin) Tube   Result Value Ref Range    Hold Specimen JIC    Extra Purple Top Tube   Result Value Ref Range    Hold Specimen JIC    T4 free   Result Value Ref Range    Free T4 2.32 (H) 0.90 - 1.70 ng/dL   Troponin T, High Sensitivity   Result Value Ref Range    Troponin T, High Sensitivity 19 (H) <=14 ng/L   Ketone Beta-Hydroxybutyrate Quantitative   Result Value Ref Range    Ketone (Beta-Hydroxybutyrate) Quantitative 1.30 (H) <=0.30 mmol/L   INR   Result Value Ref Range    INR 1.00 0.85 - 1.15   EKG 12-lead, tracing only   Result Value Ref Range    Systolic Blood Pressure  mmHg    Diastolic Blood Pressure  mmHg    Ventricular Rate 193 BPM    Atrial Rate 92 BPM    CT Interval  ms    QRS Duration 50 ms     ms    QTc 437 ms    P Axis  degrees    R AXIS 78 degrees    T Axis 20 degrees    Interpretation ECG       Undetermined rhythm  Septal infarct , age undetermined  Marked ST abnormality, possible inferior subendocardial injury  Marked ST abnormality, possible anterior subendocardial injury  Abnormal ECG  Unconfirmed report - interpretation of this ECG is computer generated - see medical record for final interpretation  Confirmed by - EMERGENCY ROOM, PHYSICIAN ([id number]),  CORY JAMES ([id number]) on 3/6/2023 10:04:02 PM     XR Chest 2 Views    Narrative    EXAM: XR CHEST 2 VIEWS  3/6/2023 8:09 PM      HISTORY: palpitations    COMPARISON: None    FINDINGS: Two views of the chest. No pleural effusion. No  pneumothorax. Normal cardiac silhouette. No acute airspace  opacities.  No aggressive osseous abnormalities. Visualized upper abdomen is  unremarkable.      Impression    IMPRESSION:  Clear chest    I have personally reviewed the examination and initial interpretation  and I agree with the findings.    WEI FARRELL MD         SYSTEM ID:  [id number]   CT Abdomen Pelvis w/o Contrast    Narrative    EXAMINATION: CT ABDOMEN PELVIS W/O CONTRAST, 3/6/2023 9:26 PM    INDICATION: L flank pain    COMPARISON STUDY: None    TECHNIQUE: CT scan of the abdomen and pelvis was performed on  multidetector CT scanner using volumetric acquisition technique and  images were reconstructed in multiple planes with variable thickness  and reviewed on dedicated workstations.     CONTRAST: None    CT scan radiation dose is optimized to minimum requisite dose using  automated dose modulation techniques.    FINDINGS:    Lower thorax: Bibasilar atelectasis.    Liver: No mass. No intrahepatic biliary ductal dilation.  Diffuse  hepatic steatosis    Biliary System: Normal gallbladder. No extrahepatic biliary ductal  dilation.    Pancreas: No mass or pancreatic ductal dilation.    Adrenal glands: No mass or nodules    Spleen: Normal. Splenule inferior to the spleen.    Kidneys: No suspicious mass, obstructing calculus or hydronephrosis.    Gastrointestinal tract :Normal appendix. Normal caliber small bowel. A  few scattered colonic diverticula in the sigmoid region present with  no evidence of acute diverticulitis. Prominent fatty ileocecal valve.  There is also some submucosal fatty deposition in the terminal ileum  and ascending colon which is nonspecific but can be seen in prior  inflammation. No acute inflammatory changes.    Mesentery/peritoneum/retroperitoneum: No mass. No free fluid or air.    Lymph nodes: No significant lymphadenopathy.    Vasculature: Patent major abdominal vasculature.    Pelvis: Urinary bladder is normal.  Cystic area in the cervix  consistent with a nabothian cyst. Otherwise  normal noncontrast  appearance of the uterus and ovaries.    Osseous structures: No aggressive or acute osseous lesion.  Mild  degenerative changes of the lumbar spine.      Soft tissues: Within normal limits.      Impression    IMPRESSION:   1. No acute findings in the abdomen or pelvis.  2. Hepatic steatosis.    I have personally reviewed the examination and initial interpretation  and I agree with the findings.    WEI FARRELL MD         SYSTEM ID:  [id number]       Labs, vital signs, and imaging studies were reviewed by me.    Medications   ondansetron (ZOFRAN) injection 4 mg (4 mg Intravenous $Given 3/6/23 1944)   0.9% sodium chloride BOLUS (1,000 mLs Intravenous $New Bag 3/6/23 2000)     Followed by   sodium chloride 0.9% infusion (has no administration in time range)   diltiazem (CARDIZEM) injection 10 mg (has no administration in time range)   morphine (PF) injection 4 mg (4 mg Intravenous $Given 3/6/23 2042)   ketorolac (TORADOL) injection 15 mg (15 mg Intravenous $Given 3/6/23 2041)       Assessments & Plan (with Medical Decision Making)   Andree Garcia is a 62 year old female who presents with abdominal pain, nausea, vomiting, and diarrhea. Differential diagnosis includes gastritis/gastroenteritis,  pancreatitis, hepatitis, UTI, nephrolithiasis, cholelithiasis/cholecystitis. Lab, CT ordered to further evaluate the patient. Medication for nausea and pain and IV fluids given.     Pt significantly tachycardic as well. Likely 2/2 fluid depletion and/or electrolyte abnormality from GI losses, though ACS, thyroid dysfunction would be on the differential as well. Labs, EKG, CXR ordered to further evaluate the pt. Pt's BP 120s/80s. Medications and IV fluids given to control rate.     Labs remarkable for low TSH, elevated T4, low Mg (replaced in ER)    CT abdomen shows NAD    2152 Pt was d/w cardiology, they recommend admission to internal medicine. Possible cardioversion tomorrow if patient does not have  improvement of rate/rhythm after electrolyte repletion/IV hydration    2314 Pt's HR improved to 70s.  Will repeat EKG         EKG Interpretation:      Interpreted by Sigrid Buitrago MD  Time reviewed: 0002   Symptoms at time of EKG: none   Rhythm: normal sinus   Rate: normal, 74 bpm  Axis: NORMAL  Ectopy: none  Conduction: normal  ST Segments/ T Waves: Nonspecific ST-T wave changes  Q Waves: none  Comparison to prior: compared to EKG from earlier today, atrial fibrillation and RVR has resolved    Clinical Impression: abnormal EKG    Critical care was performed.   Critical Care Addendum  My initial assessment, based on my review of vital signs, focused history, physical exam and 12 lead ECG analysis, established a high suspicion that Andree Garcia has a critical arrhythmia, which requires immediate intervention, and therefore she is critically ill.     After the initial assessment, the care team initiated multiple lab tests, initiated IV fluid administration, initiated medication therapy with cardizem, metoprolol and consulted with cardiology to provide stabilization care. Due to the critical nature of this patient, I reassessed nursing observations, vital signs, physical exam, 12 lead ECG analysis, interpretation of labs and imaging, mental status, neurologic status and respiratory status multiple times prior to her disposition.     Time also spent performing documentation, reviewing test results, discussion with consultants and coordination of care.     Critical care time (excluding teaching time and procedures): 60 minutes.     I have reviewed the nursing notes.    I have reviewed the findings, diagnosis, plan and need for follow up with the patient.    Patient discussed with IM, to be admitted to their service for further management. Plan was discussed with patient who understands and agrees with plan.    New Prescriptions    No medications on file       Final diagnoses:   Atrial fibrillation with RVR (H)    Abdominal pain, left lower quadrant   Elevated brain natriuretic peptide (BNP) level   Elevated troponin   Hypomagnesemia   Low TSH level   Leukocytosis, unspecified type   High anion gap metabolic acidosis   Nausea vomiting and diarrhea   Elevated serum free T4 level       Sigrid Buitrago MD  3/6/2023   [facility] EMERGENCY DEPARTMENT     Sigrid Buitrago MD  03/06/23 [id number]       Sigrid Buitrago MD  03/07/23 0017

## 2023-03-07 NOTE — TELEPHONE ENCOUNTER
I was contacted by Fernanda that Ms. Garcia was having atrial fibrillation with HR into the 170s bpm.    I called Ms. Garcia. She is symptomatic with this heart rate and rhythm. She is currently suffering from food poisoning and feels very dehydrated, unable to keep in any fluids.    I recommend she go to the emergency room for additional evaluation. She agrees.     Martinez Stein MD, MS

## 2023-03-07 NOTE — ED TRIAGE NOTES
Triage Assessment     Row Name 03/06/23 1940       Triage Assessment (Adult)    Airway WDL WDL       Respiratory WDL    Respiratory WDL WDL       Skin Circulation/Temperature WDL    Skin Circulation/Temperature WDL WDL       Cardiac WDL    Cardiac WDL X       Peripheral/Neurovascular WDL    Peripheral Neurovascular WDL WDL  tachycardiac       Cognitive/Neuro/Behavioral WDL    Cognitive/Neuro/Behavioral WDL WDL

## 2023-03-07 NOTE — H&P
[facility]    History and Physical - Medicine Service, [facility] TEAM      Date of Admission:  3/6/2023    Assessment & Plan      Andree Garcia is a 62 year old woman with a history of hypothyroidism, anxiety, hyperlipidemia and palpitations who presents with atrial fibrillation with rapid ventricular rhythm admitted for further workup and management.     Atrial fibrillation with rapid ventricular response   She was seen in cardiology clinic 3/2/2023 for palpitations and was put on an event monitor which noted her to be in atrial fibrillation with rates in 170s. Chronicity of atrial fibrillation unclear since it is the first time it was discovered on EKG. Prior EKGs with NSR. CHADSVASc 1 for sex, unknown diabetes history. Etiology may be her medication induced hyperthyroidism, gastroenteritis causing hypovolemia and electrolyte derangements, and will have to evaluate further for heart failure given elevated BNP. Now in NSR.   - s/p IV diltiazem and IV metoprolol x1  - Telemetry   - Echocardiogram   - A1c  - Replete electrolytes   - Hold PTA thyroid tablet   - Consider inpatient cardiology consult pending course, vs outpatient follow up   - Consider endocrinology consult/discussion in AM given elevated T4 if additional workup needed    Nausea/vomiting   Anion gap metabolic acidosis, BHB 1.3  Suspect she is having an episode of gastroenteritis since her  as well has the same symptoms and they ate similar food.  She has not been eating much over the last few days and has been having nausea and vomiting. This may be causing starvation ketosis resulting in the elevated anion gap and beta hydroxybutyrate.   - s/p 1L NS, then IV D5LR 100 ml/hr for 12 hours  - Repeat beta hydroxybutyrate in AM   - Zofran prn for nausea     Hyperthyroidism due to supplementation, hx of hypothyrodism - hold PTA NP thyroid, likely needs dose reduction or cessation of medication.      Elevated  BNP - not hypervolemic on exam, echocardiogram as above  Elevated troponin - mildly elevated, likely demand ischemia due to hypovolemia. Repeat troponin down-trended.    Leukocytosis - may be reactive in setting of gastroenteritis.   Hypomagnesemia - magnesium replacement protocol   Hypercalcemia - repeat in AM   Anxiety - hold PTA propranolol   Menopausal symptoms - hold PTA estradiol and progesterone     Diet:  Cardiac diet  DVT Prophylaxis: SCDs  Marsh Catheter: Not present  Lines: None     Cardiac Monitoring: None  Code Status:   Full code    Clinically Significant Risk Factors Present on Admission           # Hypercalcemia: Highest Ca = 10.5 mg/dL in last 2 days, will monitor as appropriate  # Hypomagnesemia: Lowest Mg = 1.4 mg/dL in last 2 days, will replace as needed  # Anion Gap Metabolic Acidosis: Highest Anion Gap = 22 mmol/L in last 2 days, will monitor and treat as appropriate                 Disposition Plan      Expected Discharge Date: 03/07/2023                Patient to be staffed in AM.     Stacey Ndiaye MD  Internal Medicine, PGY-3    Medicine Service, [facility]  Securely message with Vocera (more info)  Text page via [facility] Paging/Directory   See signed in provider for up to date coverage information  ______________________________________________________________________    Chief Complaint   Palpitations  History is obtained from the patient and chart review.    History of Present Illness   Andree A White is a 62 year old woman who presented with concerns of heart rhythm, abdominal pain, diarrhea and nausea.    Patient was notified to present to the emergency room due to her cardiac monitor recording episodes of atrial fibrillation. She has not been able to take much PO and has been having nausea and vomiting as well as diarrhea which she reports is food poisoning.     Patient reports that she has had palpitations over the last few  years that lasted a few seconds.  Over the last 2 months, she has had more consistent palpitations that she can feel all day.  She has been occupied with her  and her children and recently decided to see cardiology for this issue.  She was placed on an event monitor and after her episode of food poisoning had not been eating and drinking well.  She noted her palpitations to be more frequent and she was called by her cardiologist to present to the ED for evaluation due to atrial fibrillation with RVR with rates into the 170s noted on the monitor.    After receiving metoprolol diltiazem, she converted to normal sinus rhythm and she reports now that she is feeling fine.  She denies any chest pain, fevers, chills.  She denies any shortness of breath.  She says the abdominal pain that she had also has resolved since.  She reports she has been taking thyroid medications which are managed by a primary care provider who is private practice.  She has not seen an endocrinologist and says that she has always had issues with her thyroid.    Patient lives at home with her  and adopted children.  She has cat, dog, turtle.  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    ED course:   - Vitals: 99.1F, , RR 20, /82  - Labs: Lactate 0.9, anion gap 16. Mag 1.4, glc 212; WBC 12. BNP 1174, trop 20, TSH <0.01, T4 2.3, lipase normal  - Images: CXR without acute infiltrate, EKG irregularly irregular with rate 190, CT abdomen pelvis without acute findings, hepatic steatosis  - Interventions: Zofran IV, 1 L normal saline, ketorolac 15 mg, 4 mg morphine, 10 mg diltiazem, 5 mg metoprolol IV    Past Medical History    Past Medical History:   Diagnosis Date     Anxiety     developed with menopause     Chest pain     non cardiac     Costochondritis      Hyperlipidemia      Lyme disease        Past Surgical History   Past Surgical History:   Procedure Laterality Date     COLONOSCOPY  9/11/2012    Procedure: COLONOSCOPY;   COLONOSCOPY,  ESOPHAGOSCOPY, GASTROSCOPY, DUODENOSCOPY;  Surgeon: Dominic Gray MD;  Location:  GI     DILATION AND CURETTAGE       ESOPHAGOSCOPY, GASTROSCOPY, DUODENOSCOPY (EGD), COMBINED  9/11/2012    Procedure: COMBINED ESOPHAGOSCOPY, GASTROSCOPY, DUODENOSCOPY (EGD);;  Surgeon: Dominic Gray MD;  Location:  GI     EXCISE CONDYLOMA RECTUM       uterine polypectomy         Prior to Admission Medications   Prior to Admission Medications   Prescriptions Last Dose Informant Patient Reported? Taking?   Cholecalciferol (VITAMIN D3)   Yes No   Sig: daily   Multiple Vitamin (MULTIVITAMIN OR)   Yes No   Sig: Take by mouth daily   NP THYROID 90 MG tablet   Yes No   Sig: Take 2 tablets by mouth daily at 2 pm   Omega-3 Fatty Acids (OMEGA III EPA+DHA PO)   Yes No   Sig: Take by mouth daily   VIIBRYD 40 MG TABS tablet   Yes No   Sig: TAKE 1 TABLET BY MOUTH EVERY DAY WITH FOOD   estradiol (ESTRACE VAGINAL) 0.1 MG/GM vaginal cream   No No   Sig: Apply small amount to the vaginal opening and urethra M, W, F @ h.s.   Patient not taking: Reported on 3/2/2023   estradiol (ESTRACE VAGINAL) 0.1 MG/GM vaginal cream   No No   Sig: Apply small amount to the vaginal opening and urethra M, W, F @ h.s.   Patient not taking: Reported on 3/2/2023   estradiol (ESTRACE) 1 MG tablet   Yes No   Sig: Take 1 mg by mouth daily   progesterone (PROMETRIUM) 100 MG capsule   Yes No   Sig: Take 100 mg by mouth daily   progesterone (PROMETRIUM) 100 MG capsule   Yes No   Sig: Take 100 mg by mouth 2 times daily   propranolol (INDERAL) 10 MG tablet   Yes No   Sig: TAKE 1 TO 2 TABLETS BY MOUTH EVERY DAY AS NEEDED      Facility-Administered Medications: None        Social History   I have reviewed this patient's social history and updated it with pertinent information if needed.  Social History     Tobacco Use     Smoking status: Never     Smokeless tobacco: Never   Substance Use Topics     Alcohol use: Yes     Comment: social - maybe 3 times week      Drug use: No       Family History   I have reviewed this patient's family history and updated it with pertinent information if needed.  Family History   Problem Relation Age of Onset     Cardiovascular Father      Cerebrovascular Disease Father      C.A.D. Father      Arthritis Brother 15        juvenile arthritis     Breast Cancer Sister 50        breast cancer - in complete remission     Thyroid Disease Mother      Other - See Comments Sister         brain tumor survivor        Physical Exam   Vital Signs: Temp: 99.1  F (37.3  C) Temp src: Oral BP: 100/58 Pulse: 73   Resp: 22 SpO2: 96 % O2 Device: None (Room air)    Weight: 0 lbs 0 oz    General Appearance: Sitting up in bed, no acute distress  Eyes: Anicteric conjunctiva  Respiratory: Normal work of breathing on ambient air, clear to auscultation  Cardiovascular: Normal rate, regular rhythm, pulses 2+ radial  GI: Soft, nontender, nondistended  Skin: Warm, well perfused  Musculoskeletal: No lower extremity edema  Neurologic: Alert and oriented, conversant  Psychiatric: Appropriate, making eye contact    Data     I have personally reviewed the following data over the past 24 hrs:    12.1 (H)  \   15.3   / 462 (H)     142 104 22.5 /  212 (H)   4.1 16 (L) 0.72 \       ALT: 34 AST: 27 AP: 89 TBILI: 0.6   ALB: 4.7 TOT PROTEIN: 8.6 (H) LIPASE: 29       Trop: 20 (H); 19 (H) BNP: 1,174 (H)       TSH: <0.01 (L) T4: 2.32 (H) A1C: N/A       Procal: N/A CRP: N/A Lactic Acid: 0.9       INR:  1.00 PTT:  N/A   D-dimer:  N/A Fibrinogen:  N/A       Imaging results reviewed over the past 24 hrs:   Recent Results (from the past 24 hour(s))   XR Chest 2 Views    Narrative    EXAM: XR CHEST 2 VIEWS  3/6/2023 8:09 PM      HISTORY: palpitations    COMPARISON: None    FINDINGS: Two views of the chest. No pleural effusion. No  pneumothorax. Normal cardiac silhouette. No acute airspace opacities.  No aggressive osseous abnormalities. Visualized upper abdomen is  unremarkable.       Impression    IMPRESSION:  Clear chest    I have personally reviewed the examination and initial interpretation  and I agree with the findings.    WEI FARRELL MD         SYSTEM ID:  [id number]   CT Abdomen Pelvis w/o Contrast    Narrative    EXAMINATION: CT ABDOMEN PELVIS W/O CONTRAST, 3/6/2023 9:26 PM    INDICATION: L flank pain    COMPARISON STUDY: None    TECHNIQUE: CT scan of the abdomen and pelvis was performed on  multidetector CT scanner using volumetric acquisition technique and  images were reconstructed in multiple planes with variable thickness  and reviewed on dedicated workstations.     CONTRAST: None    CT scan radiation dose is optimized to minimum requisite dose using  automated dose modulation techniques.    FINDINGS:    Lower thorax: Bibasilar atelectasis.    Liver: No mass. No intrahepatic biliary ductal dilation.  Diffuse  hepatic steatosis    Biliary System: Normal gallbladder. No extrahepatic biliary ductal  dilation.    Pancreas: No mass or pancreatic ductal dilation.    Adrenal glands: No mass or nodules    Spleen: Normal. Splenule inferior to the spleen.    Kidneys: No suspicious mass, obstructing calculus or hydronephrosis.    Gastrointestinal tract :Normal appendix. Normal caliber small bowel. A  few scattered colonic diverticula in the sigmoid region present with  no evidence of acute diverticulitis. Prominent fatty ileocecal valve.  There is also some submucosal fatty deposition in the terminal ileum  and ascending colon which is nonspecific but can be seen in prior  inflammation. No acute inflammatory changes.    Mesentery/peritoneum/retroperitoneum: No mass. No free fluid or air.    Lymph nodes: No significant lymphadenopathy.    Vasculature: Patent major abdominal vasculature.    Pelvis: Urinary bladder is normal.  Cystic area in the cervix  consistent with a nabothian cyst. Otherwise normal noncontrast  appearance of the uterus and ovaries.    Osseous structures: No aggressive  or acute osseous lesion.  Mild  degenerative changes of the lumbar spine.      Soft tissues: Within normal limits.      Impression    IMPRESSION:   1. No acute findings in the abdomen or pelvis.  2. Hepatic steatosis.    I have personally reviewed the examination and initial interpretation  and I agree with the findings.    WEI FARRELL MD         SYSTEM ID:  [id number]

## 2023-03-08 ENCOUNTER — TELEPHONE (OUTPATIENT)
Dept: ENDOCRINOLOGY | Facility: CLINIC | Age: 63
End: 2023-03-08

## 2023-03-08 VITALS
WEIGHT: 152.8 LBS | BODY MASS INDEX: 24.66 KG/M2 | OXYGEN SATURATION: 90 % | SYSTOLIC BLOOD PRESSURE: 103 MMHG | TEMPERATURE: 97.6 F | HEART RATE: 68 BPM | DIASTOLIC BLOOD PRESSURE: 53 MMHG | RESPIRATION RATE: 18 BRPM

## 2023-03-08 DIAGNOSIS — I48.91 ATRIAL FIBRILLATION WITH RVR (H): Primary | ICD-10-CM

## 2023-03-08 PROCEDURE — G0378 HOSPITAL OBSERVATION PER HR: HCPCS

## 2023-03-08 PROCEDURE — 250N000013 HC RX MED GY IP 250 OP 250 PS 637: Performed by: HOSPITALIST

## 2023-03-08 PROCEDURE — 250N000013 HC RX MED GY IP 250 OP 250 PS 637: Performed by: INTERNAL MEDICINE

## 2023-03-08 PROCEDURE — 99239 HOSP IP/OBS DSCHRG MGMT >30: CPT | Performed by: HOSPITALIST

## 2023-03-08 RX ORDER — LEVOTHYROXINE, LIOTHYRONINE 57; 13.5 UG/1; UG/1
1.5 TABLET ORAL
COMMUNITY
Start: 2023-03-08 | End: 2023-06-21

## 2023-03-08 RX ORDER — DILTIAZEM HYDROCHLORIDE 30 MG/1
30 TABLET, FILM COATED ORAL 4 TIMES DAILY
Qty: 120 TABLET | Refills: 0 | Status: SHIPPED | OUTPATIENT
Start: 2023-03-08 | End: 2023-06-21

## 2023-03-08 RX ORDER — ONDANSETRON 4 MG/1
4 TABLET, FILM COATED ORAL EVERY 6 HOURS PRN
Qty: 12 TABLET | Refills: 0 | Status: SHIPPED | OUTPATIENT
Start: 2023-03-08 | End: 2023-06-21

## 2023-03-08 RX ORDER — IBUPROFEN 200 MG
400 TABLET ORAL ONCE
Status: COMPLETED | OUTPATIENT
Start: 2023-03-08 | End: 2023-03-08

## 2023-03-08 RX ADMIN — IBUPROFEN 400 MG: 200 TABLET, FILM COATED ORAL at 07:52

## 2023-03-08 RX ADMIN — DILTIAZEM HYDROCHLORIDE 30 MG: 30 TABLET, FILM COATED ORAL at 06:41

## 2023-03-08 RX ADMIN — DILTIAZEM HYDROCHLORIDE 30 MG: 30 TABLET, FILM COATED ORAL at 11:01

## 2023-03-08 ASSESSMENT — ACTIVITIES OF DAILY LIVING (ADL)
ADLS_ACUITY_SCORE: 31

## 2023-03-08 NOTE — TELEPHONE ENCOUNTER
[facility] Call Center    Phone Message    May a detailed message be left on voicemail: yes     Reason for Call: Other: Patient is being referred to be seen for Low TSH level. Ref by Dr Ulloa. Patient is scheduled on 9/5/23 at 2:00pm with Dr West. Sending encounter to clinic for review. Please call patient to schedule if sooner opening is needed. Of note, patient is currently inpatient as time of message to clinic     Action Taken: Message routed to:  [facility] ([facility]): Endocrinology    Travel Screening: Not Applicable

## 2023-03-08 NOTE — PROGRESS NOTES
Admission (observation)         3/7/2023  11:00PM  -----------------------------------------------------------  Diagnosis: Nausea, vomiting, and diarrhea    Admitted from: ED  Report given from: JOSE L Moctezuma RN  Via: Bed  Accompanied by: Self  Belongings: Remain with patient (clothing, shoes, cell phone and , glasses, book)  Admission Profile: Completed  Teaching: orientation to unit, call don't fall, use of console, meal times, visiting hours,  when to call for the RN (angina/sob/dizzyness, etc.), and enforced importance of safety   Skin Assessment RNs: Completed by Eliane RN and TARIQ Tan - no overt deficits noted  Vascular Access: R PIV  Telemetry: Pt on tele  Ht./Wt.: complete    Temp:  [97.8  F (36.6  C)-98.6  F (37  C)] 98.6  F (37  C)  Pulse:  [76-79] 76  Resp:  [18] 18  BP: (107-118)/(53-64) 118/58  SpO2:  [95 %-98 %] 95 %

## 2023-03-08 NOTE — PROGRESS NOTES
Hospital Medicine  I was notified of patient's going into atrial fibrillation @ 0625 - short-lived with palpitations and only 5 minutes.  Back into NSR.  Also patient requesting ibuprofen for headache.  Plan:  Continue close observation and monitoring.  Ok for one dose of ibuprofen - 400mg.  Willard Mohr MD

## 2023-03-08 NOTE — PLAN OF CARE
D: 62 year old woman with a history of hypothyroidism, anxiety, hyperlipidemia and palpitations who presents with N/V/D and atrial fibrillation with rapid ventricular rhythm admitted for further workup and management.      I: Monitored vitals and assessed pt status.   LDA: R PIV x1 SL'd  PRN: Pt has a HA, said prn Tylenol didn't work, would like ibuprofen instead - team paged, have not heard back yet  Tele: SR  -Went into Afib/flutter at 0630 rates 40's-130's, Pt felt palpitations --> then went back into SR at 0630 - team notified, have not heard back yet  O2: RA  Mobility: Independent     A: A0x4. VSS. Afebrile. Denied pain except a HA, was given PRN Tylenol in ED before coming up to 6C at 2300, did not help much. Denied numbness/tingling, dizziness/lightheadedness, NJ, SOB, N/V. Saturated fat Na <2400mg, no caffeine diet. Able to keep food down with no nausea at night in ED before coming up to 6C at 2300. Urinating adequately, voids spontaneously. Diarrhea x1 this shift, small. No overt skin deficits noted, see flowsheets for skin assessment. Appeared to sleep well overnight. Diltiazem PO given as scheduled. Up independently.      P: Continue to monitor Pt status and report changes to Gold 12.      Observation goal: Eating and drinking normally without nausea, vomiting, or diarrhea, and only needing PO anti-emetics. No IV meds for 12 hours. - progressing; able to eat some food/drink in ED before coming to 6C at 2300, denied nausea/vomiting this shift but had diarrhea x1 (small), has not needed any anti-emetic meds this shift      Time of care: [id number]

## 2023-03-08 NOTE — PROGRESS NOTES
[id number]    /58 (BP Location: Right arm)   Pulse 76   Temp 98.6  F (37  C) (Oral)   Resp 18   Wt 69.3 kg (152 lb 12.8 oz)   SpO2 95%   BMI 24.66 kg/m      Pt. A&O, denies pain, SOB, chest pain. Up independently in room. Tolerating food and fluids, denies nausea. On holter monitoring from home, tele monitoring. BAILEE, KAREN  Pt transferred to  on cardiac monitoring by float RN. Report given to Giulia POTTER

## 2023-03-08 NOTE — DISCHARGE SUMMARY
DISCHARGE  MARCH 8TH 2023  ----------------------------------------------------------------------------  Discharged to: Home  Via: Automobile  Accompanied by: Family  Discharge Instructions: diet, activity, medications, follow up appointments, when to call the MD, aftercare instructions, and what to watchout for (i.e. s/s of infection, increasing SOB, palpitations, chest pain,)  Prescriptions: To be filled by [facility] pharmacy per pt's request; medication list reviewed & sent with pt  Follow Up Appointments: arranged; information given  Belongings: All sent with pt  IV: out  Telemetry: off  Pt exhibits understanding of above discharge instructions; all questions answered.    Discharge Paperwork: Signed, copied, and sent home with patient.

## 2023-03-09 NOTE — TELEPHONE ENCOUNTER
RECORDS RECEIVED FROM:    DATE RECEIVED:    NOTES STATUS DETAILS   OFFICE NOTE from referring provider  Internal Hospital f/u   OFFICE NOTE from other cardiologists  Internal Dr. Gonzalez 3-2-23   RECORDS from hospital/ED Internal 3-6-26   MEDICATION LIST Internal    GENERAL CARDIO RECORDS   (ALL APPOINTMENT TYPES)     LABS (CBC,BMP,CMP, TSH) Internal    EKG (STRIPS & REPORTS) Internal    MONITORS (STRIPS & REPORTS) N/A Placed, but then went to ED   ECHOS (IMAGES AND REPORTS) Internal    STRESS TESTS (IMAGES AND REPORTS) N/A    cMRI (IMAGES AND REPORTS) N/A    CT/CTA (IMAGES AND REPORTS) N/A    NEW EP     ICD/PACEMAKER IMPLANT No    CARDIOVERSION N/A    TILT TABLE STUDIES N/A

## 2023-03-10 NOTE — DISCHARGE SUMMARY
[facility]  Hospitalist Discharge Summary      Date of Admission:  3/6/2023  Date of Discharge:  3/8/2023 11:00 AM  Discharging Provider: Juve Ulloa MD  Discharge Service: Hospitalist Service, GOLD TEAM 12    Discharge Diagnoses   See hospital course    Follow-ups Needed After Discharge   Follow-up Appointments     Adult [facility] Follow-up and recommended labs and tests      Follow up with primary care provider within a month to evaluate   medication change and for hospital follow- up.  The following labs/tests   are recommended: TSH in 6 weeks.      Appointments on [place] and/or [facility] (with [facility] or [facility]   provider or service). Call [phone] if you haven't heard regarding   these appointments within 7 days of discharge.               Discharge Disposition   Discharged to home  Condition at discharge: Stable      Hospital Course   Andree DALY White is a 62 year old woman with a history of hypothyroidism, anxiety, hyperlipidemia and palpitations who presents with atrial fibrillation with rapid ventricular rhythm admitted for further workup and management.     Atrial fibrillation with rapid ventricular response   She was seen in cardiology clinic 3/2/2023 for palpitations and was put on an event monitor which noted her to be in atrial fibrillation with rates in 170s. Chronicity of atrial fibrillation unclear since it is the first time it was discovered on EKG. Prior EKGs with NSR. CHADSVASc 1 for sex.   - RVR triggers: hyperthyroidism, hypovolemia, moderate etoh (recent party a few days ago). Counseled on these triggers and their avoidance  - TTE unremarkable  - messages w/ treating cardiology team 3/7: agree w/ jayne blocker to protect against recurrent RVR, and EP referral (placed, scheduled for 3/13)  - diltiazem 30mg QID (3/7 - ) until EP follow up    Hyperthyroidism due to excesss upplementation, hx of hypothyrodism  TSH   Date Value Ref Range Status  "  03/06/2023 <0.01 (L) 0.30 - 4.20 uIU/mL Final          - dose reduce NP thyroid 180 by 25% --> 135 (2 tabs to 1.5 tabs) for discharge, f/u TSH 6 weeks     Nausea/vomiting 2/2 acute gastroenteritis  Non-MI troponin elevation 2/2 hypovolemia  Suspect she is having an episode of gastroenteritis since her  as well has the same symptoms and they ate similar food. Suspect norovirus given outbreak in the community. She has not been eating much over the last few days and has been having nausea and vomiting.     - s/p fluids  - Zofran prn for nausea     Anxiety - hold PTA propranolol   Menopausal symptoms - hold PTA estradiol and progesterone      Consultations This Hospital Stay   None    Code Status   Prior    Time Spent on this Encounter   I, Juve Ulloa MD, personally saw the patient today and spent greater than 30 minutes discharging this patient.       Juve Ulloa MD  [facility] [address] [address]  Phone: [phone]  ______________________________________________________________________    Physical Exam   Vital Signs:                    Weight: 152 lbs 12.8 oz    Physical Exam   Constitutional:  NAD  HEENT: EOMI  Neck: Symmetric  Cardiovascular: rrr without murmurs or gallops  Pulmonary/Chest: CTAB. No respiratory distress.  GI: Soft, non-tender , non-distended    Musculoskeletal:  No lower extremity edema   Skin: Skin is warm and dry  Neurological: Alert and oriented   Psychiatric:  euthymic         Primary Care Physician   Physician No Ref-Primary    Discharge Orders      Adult Cardiology Eval  Referral      Adult Endocrine Referral      Reason for your hospital stay    You were admitted for fast heart rate: \"afib with rapid ventricular response\". We gave you medicine that slowed it down, and your thyroid levels were high so we reduced your thyroid medicine dose, and you should get your thyroid checked in 6 weeks by laboratory tests and see " "endocrinology (referral placed). For your heart, we started DILTIAZEM to slow the heart rate, take with food. We placed a cardiology electrophysiology referral (\"EP\") for you to see you soon and see if they can do a procedure to get rid of the afib. We prescribed a few anti-nausea pills (ONDANSETRON) as well if you need them.     It has been a pleasure caring for you,  - Your [facility] Medicine Team     Activity    Your activity upon discharge: activity as tolerated     Adult [facility] Follow-up and recommended labs and tests    Follow up with primary care provider within a month to evaluate medication change and for hospital follow- up.  The following labs/tests are recommended: TSH in 6 weeks.      Appointments on [place] and/or [facility] (with [facility] or [facility] provider or service). Call [phone] if you haven't heard regarding these appointments within 7 days of discharge.     Diet    Follow this diet upon discharge: Orders Placed This Encounter      Low caffeine       Significant Results and Procedures   Most Recent 3 BMP's:Recent Labs   Lab Test 03/07/23  0657 03/06/23  1942    142   POTASSIUM 3.7 4.1   CHLORIDE 104 104   CO2 21* 16*   BUN 18.1 22.5   CR 0.60 0.72   ANIONGAP 12 22*   PRITI 8.4* 10.5*   * 212*       Discharge Medications   Discharge Medication List as of 3/8/2023 10:24 AM      START taking these medications    Details   diltiazem (CARDIZEM) 30 MG tablet Take 1 tablet (30 mg) by mouth 4 times daily, Disp-120 tablet, R-0, E-Prescribe      ondansetron (ZOFRAN) 4 MG tablet Take 1 tablet (4 mg) by mouth every 6 hours as needed for nausea or vomiting, Disp-12 tablet, R-0, E-Prescribe         CONTINUE these medications which have CHANGED    Details   NP THYROID 90 MG tablet Take 1.5 tablets (135 mg) by mouth daily at 2 pm, ISH, Historical         CONTINUE these medications which have NOT CHANGED    Details   Cholecalciferol (VITAMIN D3) Take 1,000 Units by mouth daily, " Historical      !! estradiol (ESTRACE VAGINAL) 0.1 MG/GM vaginal cream Apply small amount to the vaginal opening and urethra M, W, F @ h.s.Disp-42.5 g, [id number]      !! estradiol (ESTRACE VAGINAL) 0.1 MG/GM vaginal cream Apply small amount to the vaginal opening and urethra M, W, F @ h.s.Disp-42.5 g, [id number]      Multiple Vitamin (MULTIVITAMIN OR) Take 1 tablet by mouth daily, Historical      Omega-3 Fatty Acids (OMEGA III EPA+DHA PO) Take 1 capsule by mouth daily, Historical      progesterone (PROMETRIUM) 100 MG capsule Take 100 mg by mouth daily, Historical      VIIBRYD 40 MG TABS tablet TAKE 1 TABLET BY MOUTH EVERY DAY WITH FOOD, ISH, Historical       !! - Potential duplicate medications found. Please discuss with provider.      STOP taking these medications       propranolol (INDERAL) 10 MG tablet Comments:   Reason for Stopping:             Allergies   Allergies   Allergen Reactions     Sulfa Drugs      Hives

## 2023-03-13 ENCOUNTER — OFFICE VISIT (OUTPATIENT)
Dept: CARDIOLOGY | Facility: CLINIC | Age: 63
End: 2023-03-13
Attending: INTERNAL MEDICINE
Payer: COMMERCIAL

## 2023-03-13 ENCOUNTER — PRE VISIT (OUTPATIENT)
Dept: CARDIOLOGY | Facility: CLINIC | Age: 63
End: 2023-03-13

## 2023-03-13 VITALS
BODY MASS INDEX: 25.37 KG/M2 | WEIGHT: 152.3 LBS | HEIGHT: 65 IN | DIASTOLIC BLOOD PRESSURE: 79 MMHG | SYSTOLIC BLOOD PRESSURE: 125 MMHG | HEART RATE: 72 BPM | OXYGEN SATURATION: 96 %

## 2023-03-13 DIAGNOSIS — I48.91 ATRIAL FIBRILLATION WITH RVR (H): Primary | ICD-10-CM

## 2023-03-13 PROCEDURE — 99417 PROLNG OP E/M EACH 15 MIN: CPT | Performed by: INTERNAL MEDICINE

## 2023-03-13 PROCEDURE — 99212 OFFICE O/P EST SF 10 MIN: CPT | Mod: 25 | Performed by: INTERNAL MEDICINE

## 2023-03-13 PROCEDURE — 99215 OFFICE O/P EST HI 40 MIN: CPT | Performed by: INTERNAL MEDICINE

## 2023-03-13 PROCEDURE — 93005 ELECTROCARDIOGRAM TRACING: CPT

## 2023-03-13 RX ORDER — SODIUM CHLORIDE 9 MG/ML
INJECTION, SOLUTION INTRAVENOUS CONTINUOUS
Status: CANCELLED | OUTPATIENT
Start: 2023-03-13

## 2023-03-13 RX ORDER — LIDOCAINE 40 MG/G
CREAM TOPICAL
Status: CANCELLED | OUTPATIENT
Start: 2023-03-13

## 2023-03-13 ASSESSMENT — PAIN SCALES - GENERAL: PAINLEVEL: NO PAIN (0)

## 2023-03-13 NOTE — LETTER
3/13/2023      RE: Andree Garcia  [address]  [facility] [address]       Dear Colleague,    Thank you for the opportunity to participate in the care of your patient, Andree Garcia, at the [facility] [place] at [facility]. Please see a copy of my visit note below.    HPI:   Andree Garcia is a 63 yo Female with a PMH of HLD, hypothyroidism, recurrent UTIs, costochondritis, Lyme disease and PAF.  She was referred for cardiology evaluation.    She is now Diltiazem 30 mg QID but cannot tolerate it due to dizziness.  She had very symptomatic PAF related to food poisoning and dehydration in 1/2023.    She denied any chest pain and SOB and complained of dizziness and palpitation.  She is having 6-7 PAF episodes lasting for an hour every day.  She stated that mental stress might be a trigger.    PAST MEDICAL HISTORY:  Past Medical History:   Diagnosis Date     Anxiety     developed with menopause     Chest pain     non cardiac     Costochondritis      Hyperlipidemia      Lyme disease        CURRENT MEDICATIONS:  Current Outpatient Medications   Medication Sig Dispense Refill     Cholecalciferol (VITAMIN D3) Take 1,000 Units by mouth daily       diltiazem (CARDIZEM) 30 MG tablet Take 1 tablet (30 mg) by mouth 4 times daily 120 tablet 0     estradiol (ESTRACE VAGINAL) 0.1 MG/GM vaginal cream Apply small amount to the vaginal opening and urethra M, W, F @ h.s. 42.5 g 3     estradiol (ESTRACE VAGINAL) 0.1 MG/GM vaginal cream Apply small amount to the vaginal opening and urethra M, W, F @ h.s. 42.5 g 3     Multiple Vitamin (MULTIVITAMIN OR) Take 1 tablet by mouth daily       NP THYROID 90 MG tablet Take 1.5 tablets (135 mg) by mouth daily at 2 pm       Omega-3 Fatty Acids (OMEGA III EPA+DHA PO) Take 1 capsule by mouth daily       ondansetron (ZOFRAN) 4 MG tablet Take 1 tablet (4 mg) by mouth every 6 hours as needed for nausea or vomiting 12 tablet 0  "    progesterone (PROMETRIUM) 100 MG capsule Take 100 mg by mouth daily       VIIBRYD 40 MG TABS tablet TAKE 1 TABLET BY MOUTH EVERY DAY WITH FOOD         PAST SURGICAL HISTORY:  Past Surgical History:   Procedure Laterality Date     COLONOSCOPY  9/11/2012    Procedure: COLONOSCOPY;  COLONOSCOPY,  ESOPHAGOSCOPY, GASTROSCOPY, DUODENOSCOPY;  Surgeon: Dominic Gray MD;  Location:  GI     DILATION AND CURETTAGE       ESOPHAGOSCOPY, GASTROSCOPY, DUODENOSCOPY (EGD), COMBINED  9/11/2012    Procedure: COMBINED ESOPHAGOSCOPY, GASTROSCOPY, DUODENOSCOPY (EGD);;  Surgeon: Dominic Gray MD;  Location:  GI     EXCISE CONDYLOMA RECTUM       uterine polypectomy         ALLERGIES:     Allergies   Allergen Reactions     Sulfa Drugs      Hives       FAMILY HISTORY:  + Premature coronary artery disease  - Atrial fibrillation  - Sudden cardiac death     SOCIAL HISTORY:  Social History     Tobacco Use     Smoking status: Never     Smokeless tobacco: Never   Substance Use Topics     Alcohol use: Yes     Comment: social - maybe 3 times week     Drug use: No       ROS:   Constitutional: No fever, chills, or sweats. Weight stable.   ENT: No visual disturbance, ear ache, epistaxis, sore throat.   Cardiovascular: As per HPI.   Respiratory: No cough, hemoptysis.    GI: No nausea, vomiting, hematemesis, melena, or hematochezia.   : No hematuria.   Integument: Negative.   Psychiatric: Negative.   Hematologic:  Easy bruising, no easy bleeding.  Neuro: Negative.   Endocrinology: No significant heat or cold intolerance   Musculoskeletal: No myalgia.    Exam:  /79 (BP Location: Right arm, Patient Position: Chair, Cuff Size: Adult Regular)   Pulse 72   Ht 1.656 m (5' 5.2\")   Wt 69.1 kg (152 lb 4.8 oz)   LMP 06/19/2012   SpO2 96%   BMI 25.19 kg/m    GENERAL APPEARANCE: healthy, alert and no distress  HEENT: no icterus, no xanthelasmas, normal pupil size and reaction, normal palate, mucosa moist, no central cyanosis  NECK: no " adenopathy, no asymmetry, masses, or scars, thyroid normal to palpation and no bruits, JVP not elevated  RESPIRATORY: lungs clear to auscultation - no rales, rhonchi or wheezes, no use of accessory muscles, no retractions, respirations are unlabored, normal respiratory rate  CARDIOVASCULAR: regular rhythm, normal S1 with physiologic split S2, no S3 or S4 and no murmur, click or rub, precordium quiet with normal PMI.  ABDOMEN: soft, non tender, without hepatosplenomegaly, no masses palpable, bowel sounds normal, aorta not enlarged by palpation, no abdominal bruits  EXTREMITIES: peripheral pulses normal, no edema, no bruits  NEURO: alert and oriented to person/place/time, normal speech, gait and affect  VASC: Radial, femoral, dorsalis pedis and posterior tibialis pulses are normal in volumes and symmetric bilaterally. No bruits are heard.  SKIN: no ecchymoses, no rashes    Labs:  CBC RESULTS:   Lab Results   Component Value Date    WBC 5.2 03/07/2023    WBC 5.0 08/10/2012    RBC 4.19 03/07/2023    RBC 3.89 08/10/2012    HGB 12.2 03/07/2023    HGB 11.9 08/10/2012    HCT 38.8 03/07/2023    HCT 35.9 08/10/2012    MCV 93 03/07/2023    MCV 92 08/10/2012    MCH 29.1 03/07/2023    MCH 30.6 08/10/2012    MCHC 31.4 (L) 03/07/2023    MCHC 33.1 08/10/2012    RDW 12.1 03/07/2023    RDW 13.4 08/10/2012     03/07/2023     08/10/2012       BMP RESULTS:  Lab Results   Component Value Date     03/07/2023     08/10/2012    POTASSIUM 3.7 03/07/2023    POTASSIUM 3.9 08/10/2012    CHLORIDE 104 03/07/2023    CHLORIDE 103 08/10/2012    CO2 21 (L) 03/07/2023    CO2 27 08/10/2012    ANIONGAP 12 03/07/2023    ANIONGAP 8 08/10/2012     (H) 03/07/2023    GLC 94 08/10/2012    BUN 18.1 03/07/2023    BUN 13 08/10/2012    CR 0.60 03/07/2023    CR 0.66 08/10/2012    GFRESTIMATED >90 03/07/2023    GFRESTIMATED >90 08/10/2012    GFRESTBLACK >90 08/10/2012    PRITI 8.4 (L) 03/07/2023    PRITI 9.5 08/10/2012        INR  RESULTS:  Lab Results   Component Value Date    INR 1.00 03/06/2023       Procedures:  TTE on 3/7/2023: Reviewed.  Interpretation Summary  Patient in sinus rhythm during exam.  Global and regional left ventricular function is normal with an EF of 55-60%.  Global right ventricular function is normal. The right ventricle is normal  size.  No significant valvular abnormalities.  The estimated PA systolic pressure is 43 mmHg.  IVC diameter and respiratory changes fall into an intermediate range  suggesting an RA pressure of 8 mmHg.  There is no prior study for direct comparison.    ECG on 3/6/2023: Reviewed.    ECG on 3/13/2023: Reviewed.      Assessment and Plan:   # HLD  # Hypothyroidism  # Recurrent UTIs  # Costochondritis  # Lyme disease  # Intolerance of Diltiazem.  # PAF  Symptomatic. Refractory to Diltiazem.   We discussed treatment options including medication with AADs and catheter ablation.  The nature, risks, benefits, alternatives and anticipated results of the procedure were explained to the patient. These risks include but are not limited to local vascular damage, bleeding, pulmonary vein stenosis, AV block requiring a pacemaker implantation, tamponade and stroke. After careful consideration the patient wished to proceed with the procedure. The patient was verbally consented. We will schedule the patient to have this done at her earliest convenience.  I advised her to take Diltiazem as needed and take ASA 81 mg daily.  I advised her to switch ASA to Eliquis 2 weeks before the ABL and continue for 4 weeks after the ABL and then switch it back to ASA.  She plans to travel soon and would like to try AADs before the ABL.  I will schedule her to take NM stress test and if it is WNL, I will put her on Flecainide 50 mg BID.    I spent a total of 55 min today to review the records, see the patient, and complete the documents.     CC  Patient Care Team:  No Ref-Primary, Physician as PCP - Sofia Keene  GUILLE as Assigned OBGYN Provider  Maria Eugenia West MD as MD (Endocrinology, Diabetes, and Metabolism)  AB CALDERON        Please do not hesitate to contact me if you have any questions/concerns.     Sincerely,     Geni Frye MD

## 2023-03-13 NOTE — PATIENT INSTRUCTIONS
You were seen in the Electrophysiology Clinic today by: Dr. Frye     Plan:   Medication Changes:   START Aspirin 81 mg daily.   Stop taking Diltiazem daily, only take as needed for Afib episodes   Start Eliquis 2 weeks before ablation procedure     Labs/Tests Needed:   Lexiscan stress test. If results normal, start Flecainide 50 mg twice daily.   Afib Ablation. Please contact us when you are ready to schedule.     Follow up Visit: with Dr. Frye 3 months after ablation procedure. We will repeat 14 day ziopatch heart monitor 1 month prior to your follow up appointment.     If you have further questions, please utilize Kite to contact us.     Your Care Team:    EP Cardiology   Telephone Number     Nurse Line  Chris Benitez RN    [phone]     For scheduling appointments:   Luis   [phone]   For procedure scheduling:    Marcela Jamison     [phone]   For the Device Clinic (Pacemakers, ICDs, Loop Recorders)    During business hours: [phone]  After business hours:   [phone]- select option 4 and ask for job code [id number].       On-call cardiologist for after hours or on weekends:   [phone], option #4, and ask to speak to the on-call cardiologist.     Cardiovascular Clinic:   [address] [place] [address]. [place] [address]      As always, Thank you for trusting us with your health care needs!     Lexiscan Nuclear Med Stress Test  Report to the  [facility] Waiting Room at the [facility]. The hospital is located at [address] [place] [address] on the [place] of the [place].  A.  Do not eat 3 hours prior to the test, you may drink water or juice.  B.  No caffeine or decaf, alcohol, or smoking 12 hours prior to the test  C. Take your usual morning medications with a sip of water.   D.  Allow 3-4 hours for the procedure.    If you have further questions, please utilize Kite to contact us.   If your question concerns the above instructions, contact:  Chris Benitez RN   Cardiology Nurse Coordinator       If your question concerns the schedule/appointment times, contact:  Mira MIRZA   [phone]    You are scheduled for an Atrial Fibrillation Ablation, at [facility], with Dr. Frye. The hospital is located at [address] [place] [address] on the [place] bank of the [place].       Pre-Admission Phone Call will occur 1-2 days prior to procedure date.  You do not need to come to the [place].    Visitor Policy: Two visitors.    Date:   LUCAS (transesophageal echocardiogram), CT Angiogram  _______: Arrive to the [facility] Waiting Room for your: CT & LUCAS with INR check if on warfarin.      -Only if you take warfarin: If your INR is too high or too low, your procedure may be canceled, or you may be required to stay overnight in the hospital.    -If there is evidence of a clot in your heart on the LUCAS,  the procedure will be canceled.     LUCAS Instructions:  1. Nothing to eat or drink 8 hours prior.  2. No additional medication instructions.  3. You will receive sedation for the LUCAS so you will need someone to drive you home from the hospital and stay with you for 6 hours. Do not make any legal decisions for 24 hours.     CT Angiogram Instructions:  1. No food and drink restrictions. The day before your exam, drink extra fluids-at least six 8-ounce glasses (unless you follow a fluid-restricted diet). Drink extra water the day after as well.  2. Avoid caffeine, smoking, or strenuous activity on the day of the test.  3. If you take any of the following medications, please HOLD the day of:   * Metformin or Glucophage the morning of and wait 48 hours before re-starting   * Diuretic the day of. (Examples: Furosemide (Lasix), Torsemide, Bumetanide (Bumex), Metolazone (Zaroxolyn) and Hydrochlorothiazide.)    * NSAIDS (Examples: ibuprofen/Advil/Motrin, naproxen/Aleve) the day of.   * Erectile dysfunction medications (Examples: Viagra, Cialis, Levitra).  4. Please allow 2 hours of time for  preparation and testing          Date:   Time: ________________to Unit 3C at the [facility]  Atrial Fibrillation Ablation Procedure     1. Please review the attached instructions on showering before your procedure at the end of this letter.  2. Your history and physical will be completed by our nurse practitioner when you arrive.  3. Please do not eat anything for 8 hours prior to your procedure. You may have sips of water up until 2 hours prior to your arrival.  4. The morning of your procedure, you may take your scheduled medications with a sip of water - continue your blood thinner (warfarin, Pradaxa, Eliquis, Xarelto).  Please take your morning dose before arriving to the hospital.   HOLD:  - Diuretic (if taking) day of for comfort   6. If the imaging shows a clot in your heart, the procedure will be canceled.   7. You will receive general anesthesia for this procedure.   8. You will likely discharge the same day and need a .      Post-Procedure Instructions  Care of groin site:   Remove the Band-Aid after 24 hours. If there is minor oozing, apply another Band-aid and remove it after 12 hours.    Do NOT take a bath, use a hot tub, pool, or submerse in water for at least 3 days. You may shower.    It is normal to have a small bruise or lump at the site.   Do not scrub the site.   Do not use lotion or powder near the puncture site for 3 days.    If you start bleeding from the site in your groin: Lie down flat and press firmly on the site. Call your physician immediately, or, come to the emergency room.  Call 911 right away if you have bleeding that is heavy or does not stop.    Call your doctor/provider if:    You have a large or growing hard lump around the site.    The site is red, swollen, hot or tender.    You have chills or a fever greater than 101 F (38 C).    Blood or fluid is draining from the site.    Your leg or arm turns bluish, feels numb or cool.    You have hives, a rash or unusual  itching.      Activity Restrictions   For the first 2 days: Do not stoop or squat. When you cough, sneeze or move your bowels, hold your hand over the puncture site and press gently.   Do not lift more than 10 pounds or exertional activity for 10 days.  - No driving for 24 hours after (with or without general anesthesia).         Date: _______ Follow up with Dr. Frye with ziopatch done 1 month prior (this will be mailed to your home).          Please do not hesitate to utilize MyChart or call us if you have any questions or concerns.    Chris Benitez, RN   Cardiology Nurse Coordinator      Marcela Jamison,  [facility] Procedure   [phone]      Showering Before Surgery   Your surgeon has asked you to take 2 showers before surgery.  Why is this important?  It is normal for bacteria (germs) to be on your skin. The skin protects us from these germs. When you have surgery, we cut the skin. Sometimes germs get into the cuts and cause infection (illness caused by germs). By following the instructions below and using special soap, you will lower the number of germs on your skin. This decreases your chance of infection.  Special soap  Buy or get 8 ounces of antiseptic surgical soap called 4% CHG. Common name brands of this soap are Hibiclens and Exidine.   You can find it at your local pharmacy, clinic or retail store. If you have trouble, ask your pharmacist to help you find the right substitute.   A note about shaving:  Do not shave within 12 inches of your incision (surgical cut) area for at least 3 days before surgery. Shaving can make small cuts in the skin. This puts you at a higher risk of infection.  Items you will need for each shower:   1 newly washed towel   4 ounces of one of the above soaps  Follow these instructions:  The evening before surgery   1. Wash your hair and body with your regular shampoo and soap. Make sure you rinse the shampoo and soap from your hair and body.   2. Using clean hands, apply about 2  ounces of soap gently on your skin from the neck to your toes. Use on your groin area last. Do not use this soap on your face or head. If you get any soap in your eyes, ears or mouth, rinse right away.   3. Repeat step 2. It is very important to let the soap stay on your skin for at least 1 minute.   4. Rinse well and dry off using a clean towel.If you feel any tingling, itching or other irritation, rinse right away. It is normal to feel some coolness on the skin after using the antiseptic soap. Your skin may feel a bit dry after the shower, but do not use any lotions, creams or moisturizers. Do not use hair spray or other products in your hair.  5. Dress in freshly washed clothes or pajamas. Use fresh pillowcases and sheets on your bed.    The morning of surgery  1. Wash your hair and body with your regular shampoo and soap. Make sure you rinse the shampoo and soap from your hair and body.   2. Using clean hands, apply about 2 ounces of soap gently on your skin from the neck to your toes. Use on your groin area last. Do not use this soap on your face or head. If you get any soap in your eyes, ears or mouth, rinse right away.   3. Repeat step 2. It is very important to let the soap stay on your skin for at least 1 minute.   4. Rinse well and dry off using a clean towel.If you feel any tingling, itching or other irritation, rinse right away. It is normal to feel some coolness on the skin after using the antiseptic soap. Your skin may feel a bit dry after the shower, but do not use any lotions, creams or moisturizers. Do not use hair spray or other products in your hair.  5. Dress in clean clothes.  If you have any questions about showering or an allergy to CHG soap, please call the Preadmissions Nursing Department at the hospital where you are having your surgery.  [facility] [place] ([place]): [phone]  This phone number will be answered between the hours of 8:00 a.m. and 6:30  p.m. Monday through Friday.

## 2023-03-13 NOTE — PROGRESS NOTES
HPI:   Andree Garcia is a 61 yo Female with a PMH of HLD, hypothyroidism, recurrent UTIs, costochondritis, Lyme disease and PAF.  She was referred for cardiology evaluation.    She is now Diltiazem 30 mg QID but cannot tolerate it due to dizziness.  She had very symptomatic PAF related to food poisoning and dehydration in 1/2023.    She denied any chest pain and SOB and complained of dizziness and palpitation.  She is having 6-7 PAF episodes lasting for an hour every day.  She stated that mental stress might be a trigger.    PAST MEDICAL HISTORY:  Past Medical History:   Diagnosis Date     Anxiety     developed with menopause     Chest pain     non cardiac     Costochondritis      Hyperlipidemia      Lyme disease        CURRENT MEDICATIONS:  Current Outpatient Medications   Medication Sig Dispense Refill     Cholecalciferol (VITAMIN D3) Take 1,000 Units by mouth daily       diltiazem (CARDIZEM) 30 MG tablet Take 1 tablet (30 mg) by mouth 4 times daily 120 tablet 0     estradiol (ESTRACE VAGINAL) 0.1 MG/GM vaginal cream Apply small amount to the vaginal opening and urethra M, W, F @ h.s. 42.5 g 3     estradiol (ESTRACE VAGINAL) 0.1 MG/GM vaginal cream Apply small amount to the vaginal opening and urethra M, W, F @ h.s. 42.5 g 3     Multiple Vitamin (MULTIVITAMIN OR) Take 1 tablet by mouth daily       NP THYROID 90 MG tablet Take 1.5 tablets (135 mg) by mouth daily at 2 pm       Omega-3 Fatty Acids (OMEGA III EPA+DHA PO) Take 1 capsule by mouth daily       ondansetron (ZOFRAN) 4 MG tablet Take 1 tablet (4 mg) by mouth every 6 hours as needed for nausea or vomiting 12 tablet 0     progesterone (PROMETRIUM) 100 MG capsule Take 100 mg by mouth daily       VIIBRYD 40 MG TABS tablet TAKE 1 TABLET BY MOUTH EVERY DAY WITH FOOD         PAST SURGICAL HISTORY:  Past Surgical History:   Procedure Laterality Date     COLONOSCOPY  9/11/2012    Procedure: COLONOSCOPY;  COLONOSCOPY,  ESOPHAGOSCOPY, GASTROSCOPY, DUODENOSCOPY;   "Surgeon: Dominic Gray MD;  Location:  GI     DILATION AND CURETTAGE       ESOPHAGOSCOPY, GASTROSCOPY, DUODENOSCOPY (EGD), COMBINED  9/11/2012    Procedure: COMBINED ESOPHAGOSCOPY, GASTROSCOPY, DUODENOSCOPY (EGD);;  Surgeon: Dominic Gray MD;  Location:  GI     EXCISE CONDYLOMA RECTUM       uterine polypectomy         ALLERGIES:     Allergies   Allergen Reactions     Sulfa Drugs      Hives       FAMILY HISTORY:  + Premature coronary artery disease  - Atrial fibrillation  - Sudden cardiac death     SOCIAL HISTORY:  Social History     Tobacco Use     Smoking status: Never     Smokeless tobacco: Never   Substance Use Topics     Alcohol use: Yes     Comment: social - maybe 3 times week     Drug use: No       ROS:   Constitutional: No fever, chills, or sweats. Weight stable.   ENT: No visual disturbance, ear ache, epistaxis, sore throat.   Cardiovascular: As per HPI.   Respiratory: No cough, hemoptysis.    GI: No nausea, vomiting, hematemesis, melena, or hematochezia.   : No hematuria.   Integument: Negative.   Psychiatric: Negative.   Hematologic:  Easy bruising, no easy bleeding.  Neuro: Negative.   Endocrinology: No significant heat or cold intolerance   Musculoskeletal: No myalgia.    Exam:  /79 (BP Location: Right arm, Patient Position: Chair, Cuff Size: Adult Regular)   Pulse 72   Ht 1.656 m (5' 5.2\")   Wt 69.1 kg (152 lb 4.8 oz)   LMP 06/19/2012   SpO2 96%   BMI 25.19 kg/m    GENERAL APPEARANCE: healthy, alert and no distress  HEENT: no icterus, no xanthelasmas, normal pupil size and reaction, normal palate, mucosa moist, no central cyanosis  NECK: no adenopathy, no asymmetry, masses, or scars, thyroid normal to palpation and no bruits, JVP not elevated  RESPIRATORY: lungs clear to auscultation - no rales, rhonchi or wheezes, no use of accessory muscles, no retractions, respirations are unlabored, normal respiratory rate  CARDIOVASCULAR: regular rhythm, normal S1 with physiologic split S2, no " S3 or S4 and no murmur, click or rub, precordium quiet with normal PMI.  ABDOMEN: soft, non tender, without hepatosplenomegaly, no masses palpable, bowel sounds normal, aorta not enlarged by palpation, no abdominal bruits  EXTREMITIES: peripheral pulses normal, no edema, no bruits  NEURO: alert and oriented to person/place/time, normal speech, gait and affect  VASC: Radial, femoral, dorsalis pedis and posterior tibialis pulses are normal in volumes and symmetric bilaterally. No bruits are heard.  SKIN: no ecchymoses, no rashes    Labs:  CBC RESULTS:   Lab Results   Component Value Date    WBC 5.2 03/07/2023    WBC 5.0 08/10/2012    RBC 4.19 03/07/2023    RBC 3.89 08/10/2012    HGB 12.2 03/07/2023    HGB 11.9 08/10/2012    HCT 38.8 03/07/2023    HCT 35.9 08/10/2012    MCV 93 03/07/2023    MCV 92 08/10/2012    MCH 29.1 03/07/2023    MCH 30.6 08/10/2012    MCHC 31.4 (L) 03/07/2023    MCHC 33.1 08/10/2012    RDW 12.1 03/07/2023    RDW 13.4 08/10/2012     03/07/2023     08/10/2012       BMP RESULTS:  Lab Results   Component Value Date     03/07/2023     08/10/2012    POTASSIUM 3.7 03/07/2023    POTASSIUM 3.9 08/10/2012    CHLORIDE 104 03/07/2023    CHLORIDE 103 08/10/2012    CO2 21 (L) 03/07/2023    CO2 27 08/10/2012    ANIONGAP 12 03/07/2023    ANIONGAP 8 08/10/2012     (H) 03/07/2023    GLC 94 08/10/2012    BUN 18.1 03/07/2023    BUN 13 08/10/2012    CR 0.60 03/07/2023    CR 0.66 08/10/2012    GFRESTIMATED >90 03/07/2023    GFRESTIMATED >90 08/10/2012    GFRESTBLACK >90 08/10/2012    PRITI 8.4 (L) 03/07/2023    PRITI 9.5 08/10/2012        INR RESULTS:  Lab Results   Component Value Date    INR 1.00 03/06/2023       Procedures:  TTE on 3/7/2023: Reviewed.  Interpretation Summary  Patient in sinus rhythm during exam.  Global and regional left ventricular function is normal with an EF of 55-60%.  Global right ventricular function is normal. The right ventricle is normal  size.  No significant  valvular abnormalities.  The estimated PA systolic pressure is 43 mmHg.  IVC diameter and respiratory changes fall into an intermediate range  suggesting an RA pressure of 8 mmHg.  There is no prior study for direct comparison.    ECG on 3/6/2023: Reviewed.    ECG on 3/13/2023: Reviewed.      Assessment and Plan:   # HLD  # Hypothyroidism  # Recurrent UTIs  # Costochondritis  # Lyme disease  # Intolerance of Diltiazem.  # PAF  Symptomatic. Refractory to Diltiazem.   We discussed treatment options including medication with AADs and catheter ablation.  The nature, risks, benefits, alternatives and anticipated results of the procedure were explained to the patient. These risks include but are not limited to local vascular damage, bleeding, pulmonary vein stenosis, AV block requiring a pacemaker implantation, tamponade and stroke. After careful consideration the patient wished to proceed with the procedure. The patient was verbally consented. We will schedule the patient to have this done at her earliest convenience.  I advised her to take Diltiazem as needed and take ASA 81 mg daily.  I advised her to switch ASA to Eliquis 2 weeks before the ABL and continue for 4 weeks after the ABL and then switch it back to ASA.  She plans to travel soon and would like to try AADs before the ABL.  I will schedule her to take NM stress test and if it is WNL, I will put her on Flecainide 50 mg BID.    I spent a total of 55 min today to review the records, see the patient, and complete the documents.     CC  Patient Care Team:  No Ref-Primary, Physician as PCP - Sofia Keene PA-C as Assigned OBGYN Provider  Maria Eugenia West MD as MD (Endocrinology, Diabetes, and Metabolism)  AB CALDERON

## 2023-03-13 NOTE — NURSING NOTE
Chief Complaint   Patient presents with     New Patient     NEW EP d/t Afib        Vitals were taken, medications reconciled, and EKG was performed.    Andres Wilson EMT  1:28 PM

## 2023-03-13 NOTE — LETTER
March 13, 2023      TO: Andree Garcia  [address]  [facility] [address]         Dear Andree,    You are scheduled for an Atrial Fibrillation Ablation, at [facility], [place], with Dr. Frye. The hospital is located at [address] [place] [address] on the [place] bank of the [place].     Pre-Admission Phone Call will occur 1-2 days prior to procedure date.  You do not need to come to the [place] [facility].    Visitor Policy: Two visitors.    Date:  May 3, 2023     LUCAS (transesophageal echocardiogram), CT Angiogram  __8a_____: Arrive to the [facility] Waiting Room for your: CT & LUCAS with INR check if on warfarin.      -Only if you take warfarin: If your INR is too high or too low, your procedure may be canceled, or you may be required to stay overnight in the hospital.    -If there is evidence of a clot in your heart on the LUCAS,  the procedure will be canceled.     LUCAS Instructions:  1. Nothing to eat or drink 8 hours prior.  2. No additional medication instructions.  3. You will receive sedation for the LUCAS so you will need someone to drive you home from the hospital and stay with you for 6 hours. Do not make any legal decisions for 24 hours.     CT Angiogram Instructions:  1. No food and drink restrictions. The day before your exam, drink extra fluids-at least six 8-ounce glasses (unless you follow a fluid-restricted diet). Drink extra water the day after as well.  2. Avoid caffeine, smoking, or strenuous activity on the day of the test.  3. If you take any of the following medications, please HOLD the day of:   * Metformin or Glucophage the morning of and wait 48 hours before re-starting   * Diuretic the day of. (Examples: Furosemide (Lasix), Torsemide, Bumetanide (Bumex), Metolazone (Zaroxolyn) and Hydrochlorothiazide.)    * NSAIDS (Examples: ibuprofen/Advil/Motrin, naproxen/Aleve) the day of.   * Erectile dysfunction medications (Examples: Viagra, Cialis, Levitra).  4. Please allow 2 hours of  time for preparation and testing      Date:   May 4, 2023   Time: ___6:30 am_____to [address] at the [facility]  Atrial Fibrillation Ablation Procedure     1. Please review the attached instructions on showering before your procedure at the end of this letter.  2. Your history and physical will be completed by our nurse practitioner when you arrive.  3. Please do not eat anything for 8 hours prior to your procedure. You may have sips of water up until 2 hours prior to your arrival.  4. If you are diabetic follow the following instructions: (Contact your diabetes team if you have questions)   * Hold oral diabetic medications (metformin, glipizide, etc) and short-acting insulins (aspart, lispro, regular) the morning of the procedure.              * Hold SGLT2 inhibitors (dapagliflozin (Farxiga), empagliflozin (Jardiance), ertugliflozin (Steglatro), canagliflozin (Invokana)) 4 days prior.               * Hold non-insulin injectable liraglutide (Victoza) the evening before and/or morning of the procedure.   * Hold mixed insulins (70/30, 75/25, 50/50) the morning of procedure. Instead, take 66% of NPH (N) component.   * Take 80% of your normal long-acting insulin (glargine/Lantus or levemir/Detemir, degludec/Tresiba) the evening before and/or morning of the procedure.  5. The morning of your procedure, you may take your scheduled medications with a sip of water - continue your blood thinner (warfarin, Pradaxa, Eliquis, Xarelto).  Please take your morning dose before arriving to the hospital.   HOLD:  - Diuretic (if taking) day of for comfort   6. If the imaging shows a clot in your heart, the procedure will be canceled.   7. You will receive general anesthesia for this procedure.   8. You will likely discharge the same day and need a .      Post-Procedure Instructions  Care of groin site:   Remove the Band-Aid after 24 hours. If there is minor oozing, apply another Band-aid and remove it after 12 hours.    Do NOT  take a bath, use a hot tub, pool, or submerse in water for at least 3 days. You may shower.    It is normal to have a small bruise or lump at the site.   Do not scrub the site.   Do not use lotion or powder near the puncture site for 3 days.    If you start bleeding from the site in your groin: Lie down flat and press firmly on the site. Call your physician immediately, or, come to the emergency room.  Call 911 right away if you have bleeding that is heavy or does not stop.  Call your doctor/provider if:    You have a large or growing hard lump around the site.    The site is red, swollen, hot or tender.    You have chills or a fever greater than 101 F (38 C).    Blood or fluid is draining from the site.    Your leg or arm turns bluish, feels numb or cool.    You have hives, a rash or unusual itching.      Activity Restrictions   For the first 2 days: Do not stoop or squat. When you cough, sneeze or move your bowels, hold your hand over the puncture site and press gently.   Do not lift more than 10 pounds or exertional activity for 10 days.  - No driving for 24 hours after (with or without general anesthesia).       Date: __August 11, 2023 at 10:30 am_____ Follow up with Dr. Frye  with daya done 1 month prior (this will be mailed to your home).      Please do not hesitate to utilize MyChart or call us if you have any questions or concerns.    Chris Benitez RN   Cardiology Nurse Coordinator      [facility]   [phone]                  Showering Before Surgery   Your surgeon has asked you to take 2 showers before surgery.  Why is this important?  It is normal for bacteria (germs) to be on your skin. The skin protects us from these germs. When you have surgery, we cut the skin. Sometimes germs get into the cuts and cause infection (illness caused by germs). By following the instructions below and using special soap, you will lower the number of germs on your skin. This decreases your  chance of infection.  Special soap  Buy or get 8 ounces of antiseptic surgical soap called 4% CHG. Common name brands of this soap are Hibiclens and Exidine.   You can find it at your local pharmacy, clinic or retail store. If you have trouble, ask your pharmacist to help you find the right substitute.   A note about shaving:  Do not shave within 12 inches of your incision (surgical cut) area for at least 3 days before surgery. Shaving can make small cuts in the skin. This puts you at a higher risk of infection.  Items you will need for each shower:   1 newly washed towel   4 ounces of one of the above soaps  Follow these instructions:  The evening before surgery   1. Wash your hair and body with your regular shampoo and soap. Make sure you rinse the shampoo and soap from your hair and body.   2. Using clean hands, apply about 2 ounces of soap gently on your skin from the neck to your toes. Use on your groin area last. Do not use this soap on your face or head. If you get any soap in your eyes, ears or mouth, rinse right away.   3. Repeat step 2. It is very important to let the soap stay on your skin for at least 1 minute.   4. Rinse well and dry off using a clean towel.If you feel any tingling, itching or other irritation, rinse right away. It is normal to feel some coolness on the skin after using the antiseptic soap. Your skin may feel a bit dry after the shower, but do not use any lotions, creams or moisturizers. Do not use hair spray or other products in your hair.  5. Dress in freshly washed clothes or pajamas. Use fresh pillowcases and sheets on your bed.    The morning of surgery  1. Wash your hair and body with your regular shampoo and soap. Make sure you rinse the shampoo and soap from your hair and body.   2. Using clean hands, apply about 2 ounces of soap gently on your skin from the neck to your toes. Use on your groin area last. Do not use this soap on your face or head. If you get any soap in your  eyes, ears or mouth, rinse right away.   3. Repeat step 2. It is very important to let the soap stay on your skin for at least 1 minute.   4. Rinse well and dry off using a clean towel.If you feel any tingling, itching or other irritation, rinse right away. It is normal to feel some coolness on the skin after using the antiseptic soap. Your skin may feel a bit dry after the shower, but do not use any lotions, creams or moisturizers. Do not use hair spray or other products in your hair.  5. Dress in clean clothes.  If you have any questions about showering or an allergy to CHG soap, please call the Preadmissions Nursing Department at the hospital where you are having your surgery.  [facility] [place] ([place]): [phone]  This phone number will be answered between the hours of 8:00 a.m. and 6:30 p.m. Monday through Friday.

## 2023-03-13 NOTE — NURSING NOTE
Med changes:  Stop taking Diltiazem daily. Only take as needed for Afib episodes  Start daily aspirin     Tests:   Lexiscan stress test. If normal start Flecainide 50 mg BID.   Afib ablation. Once scheduled pt to start Eliquis 5 mg BID 2 week prior to ablation and 4 weeks post ablation. Pt will hold asa while taking eliquis and resume 1 month post ablation.     Follow up:  With Dr. Frye 3 months post ablation. 14 day zio 1 month prior to follow up appt.     Chris Benitez RN   Cardiology Nurse Coordinator

## 2023-03-14 LAB
ATRIAL RATE - MUSE: 67 BPM
DIASTOLIC BLOOD PRESSURE - MUSE: NORMAL MMHG
INTERPRETATION ECG - MUSE: NORMAL
P AXIS - MUSE: 69 DEGREES
PR INTERVAL - MUSE: 176 MS
QRS DURATION - MUSE: 66 MS
QT - MUSE: 388 MS
QTC - MUSE: 409 MS
R AXIS - MUSE: 21 DEGREES
SYSTOLIC BLOOD PRESSURE - MUSE: NORMAL MMHG
T AXIS - MUSE: -4 DEGREES
VENTRICULAR RATE- MUSE: 67 BPM

## 2023-03-15 ENCOUNTER — TELEPHONE (OUTPATIENT)
Dept: CARDIOLOGY | Facility: CLINIC | Age: 63
End: 2023-03-15
Payer: COMMERCIAL

## 2023-03-15 NOTE — TELEPHONE ENCOUNTER
EP Scheduling called the patient to schedule ablation with Dr. Frye. The number [phone] was left for the patient to return the call and schedule the procedure.    Marcela Jamison  Periop Electrophysiology   [phone]

## 2023-03-21 ENCOUNTER — HOSPITAL ENCOUNTER (OUTPATIENT)
Dept: NUCLEAR MEDICINE | Facility: CLINIC | Age: 63
Setting detail: NUCLEAR MEDICINE
Discharge: HOME | End: 2023-03-21
Attending: INTERNAL MEDICINE
Payer: COMMERCIAL

## 2023-03-21 ENCOUNTER — HOSPITAL ENCOUNTER (OUTPATIENT)
Dept: CARDIOLOGY | Facility: CLINIC | Age: 63
Discharge: HOME | End: 2023-03-21
Attending: INTERNAL MEDICINE
Payer: COMMERCIAL

## 2023-03-21 DIAGNOSIS — I48.91 ATRIAL FIBRILLATION WITH RVR (H): ICD-10-CM

## 2023-03-21 LAB
CV STRESS MAX HR HE: 105
RATE PRESSURE PRODUCT: NORMAL
STRESS ECHO BASELINE DIASTOLIC HE: 55
STRESS ECHO BASELINE HR: 72 BPM
STRESS ECHO BASELINE SYSTOLIC BP: 127
STRESS ECHO CALCULATED PERCENT HR: 66 %
STRESS ECHO LAST STRESS DIASTOLIC BP: 70
STRESS ECHO LAST STRESS SYSTOLIC BP: 130
STRESS ECHO TARGET HR: 160

## 2023-03-21 PROCEDURE — 78452 HT MUSCLE IMAGE SPECT MULT: CPT

## 2023-03-21 PROCEDURE — 343N000001 HC RX 343: Performed by: INTERNAL MEDICINE

## 2023-03-21 PROCEDURE — 78452 HT MUSCLE IMAGE SPECT MULT: CPT | Mod: 26 | Performed by: INTERNAL MEDICINE

## 2023-03-21 PROCEDURE — 93016 CV STRESS TEST SUPVJ ONLY: CPT | Performed by: STUDENT IN AN ORGANIZED HEALTH CARE EDUCATION/TRAINING PROGRAM

## 2023-03-21 PROCEDURE — 93017 CV STRESS TEST TRACING ONLY: CPT

## 2023-03-21 PROCEDURE — A9502 TC99M TETROFOSMIN: HCPCS | Performed by: INTERNAL MEDICINE

## 2023-03-21 PROCEDURE — 93018 CV STRESS TEST I&R ONLY: CPT | Performed by: INTERNAL MEDICINE

## 2023-03-21 PROCEDURE — 250N000011 HC RX IP 250 OP 636: Performed by: INTERNAL MEDICINE

## 2023-03-21 RX ORDER — REGADENOSON 0.08 MG/ML
0.4 INJECTION, SOLUTION INTRAVENOUS ONCE
Status: COMPLETED | OUTPATIENT
Start: 2023-03-21 | End: 2023-03-21

## 2023-03-21 RX ORDER — ALBUTEROL SULFATE 90 UG/1
2 AEROSOL, METERED RESPIRATORY (INHALATION) EVERY 5 MIN PRN
Status: DISCONTINUED | OUTPATIENT
Start: 2023-03-21 | End: 2023-03-22 | Stop reason: HOSPADM

## 2023-03-21 RX ORDER — AMINOPHYLLINE 25 MG/ML
50-100 INJECTION, SOLUTION INTRAVENOUS
Status: DISCONTINUED | OUTPATIENT
Start: 2023-03-21 | End: 2023-03-22 | Stop reason: HOSPADM

## 2023-03-21 RX ORDER — ACYCLOVIR 200 MG/1
0-1 CAPSULE ORAL
Status: DISCONTINUED | OUTPATIENT
Start: 2023-03-21 | End: 2023-03-22 | Stop reason: HOSPADM

## 2023-03-21 RX ORDER — CAFFEINE CITRATE 20 MG/ML
60 SOLUTION INTRAVENOUS
Status: DISCONTINUED | OUTPATIENT
Start: 2023-03-21 | End: 2023-03-22 | Stop reason: HOSPADM

## 2023-03-21 RX ADMIN — TETROFOSMIN 10.3 MILLICURIE: 1.38 INJECTION, POWDER, LYOPHILIZED, FOR SOLUTION INTRAVENOUS at 12:30

## 2023-03-21 RX ADMIN — TETROFOSMIN 39 MILLICURIE: 1.38 INJECTION, POWDER, LYOPHILIZED, FOR SOLUTION INTRAVENOUS at 13:58

## 2023-03-21 RX ADMIN — REGADENOSON 0.4 MG: 0.08 INJECTION, SOLUTION INTRAVENOUS at 13:19

## 2023-03-21 NOTE — RESULT ENCOUNTER NOTE
Please tell her that her stress test was WNL.  Also please start her on Flecainide 50 mg BID.  Thank you.  Geni Frye

## 2023-03-22 DIAGNOSIS — I48.91 ATRIAL FIBRILLATION WITH RVR (H): Primary | ICD-10-CM

## 2023-03-22 RX ORDER — FLECAINIDE ACETATE 50 MG/1
50 TABLET ORAL 2 TIMES DAILY
Qty: 120 TABLET | Refills: 1 | Status: SHIPPED | OUTPATIENT
Start: 2023-03-22 | End: 2023-06-21

## 2023-03-22 NOTE — PROGRESS NOTES
Date: 3/22/2023    Time of Call: 2:52 PM     Diagnosis:  Afib      [ TORB ] Ordering provider: Dr. Frye  Order: Start Flecainide 50 mg BID     Order received by: TARIQ Ziegler      Follow-up/additional notes: Pt notified in results message.     Chris Benitez RN   Cardiology Nurse Coordinator

## 2023-03-22 NOTE — TELEPHONE ENCOUNTER
Patient returned the call to  after 4:30 pm on 3/21. EP  attempted 2 calls back to the patient. The voicemail keeps cutting off 1st call was 9am and 2nd was now.     Will attempt again later.     Marcela Jamison  Periop Electrophysiology   [phone]

## 2023-03-29 ENCOUNTER — TELEPHONE (OUTPATIENT)
Dept: CARDIOLOGY | Facility: CLINIC | Age: 63
End: 2023-03-29
Payer: COMMERCIAL

## 2023-03-29 NOTE — TELEPHONE ENCOUNTER
Called pt to discuss recent Lexiscan stress test results. Results were normal so pt was instructed to start Flecainide 50 mg BID.     She is still waiting to schedule ablation. I also spoke with her about starting blood thinner 2 weeks prior to ablation and pt is requesting to take something other then Eliquis. I will contact Dr. Frye about this and get back to pt about his recommendations.     Chris Benitez RN   Cardiology Nurse Coordinator

## 2023-04-03 NOTE — TELEPHONE ENCOUNTER
Left VM for pt stating I sent other blood thinner options to her in [employer]ent[employer] message. Requested she check with insurance on what would work best for her and respond to us via ATEMEt or phone call.     Chris Benitez RN   Cardiology Nurse Coordinator

## 2023-04-19 ENCOUNTER — TELEPHONE (OUTPATIENT)
Dept: CARDIOLOGY | Facility: CLINIC | Age: 63
End: 2023-04-19
Payer: COMMERCIAL

## 2023-04-19 DIAGNOSIS — I48.91 ATRIAL FIBRILLATION WITH RVR (H): ICD-10-CM

## 2023-04-19 NOTE — TELEPHONE ENCOUNTER
Health Call Center    Phone Message    May a detailed message be left on voicemail: yes     Reason for Call: Medication Question or concern regarding medication   Prescription Clarification  Name of Medication: rivaroxaban ANTICOAGULANT (XARELTO) 20 MG TABS tablet  Prescribing Provider: Dr Frye   Pharmacy:    [id number] [address]     What on the order needs clarification? The medication is going to cost the patient $200 plus dollars for 42 pills and she found a recepeit on line but the pharmacy said the rx needs to be in 30, 60 or 90 pill increments  Please send new prescription          Action Taken: Other: Cardiology    Travel Screening: Not Applicable     Thank you!  [facility]

## 2023-04-19 NOTE — TELEPHONE ENCOUNTER
New RX sent for 60 tablets of Xarelto to [facility] per pt request   Mae Hutchinson RN on 4/19/2023 at 4:29 PM

## 2023-05-03 ENCOUNTER — HOSPITAL ENCOUNTER (OUTPATIENT)
Dept: CARDIOLOGY | Facility: CLINIC | Age: 63
Discharge: HOME | End: 2023-05-03
Attending: INTERNAL MEDICINE
Payer: COMMERCIAL

## 2023-05-03 ENCOUNTER — LAB (OUTPATIENT)
Dept: LAB | Facility: CLINIC | Age: 63
End: 2023-05-03
Attending: INTERNAL MEDICINE
Payer: COMMERCIAL

## 2023-05-03 ENCOUNTER — HOSPITAL ENCOUNTER (OUTPATIENT)
Dept: CT IMAGING | Facility: CLINIC | Age: 63
Discharge: HOME | End: 2023-05-03
Attending: INTERNAL MEDICINE
Payer: COMMERCIAL

## 2023-05-03 ENCOUNTER — ANESTHESIA EVENT (OUTPATIENT)
Dept: CARDIOLOGY | Facility: CLINIC | Age: 63
End: 2023-05-03
Payer: COMMERCIAL

## 2023-05-03 VITALS
RESPIRATION RATE: 14 BRPM | HEART RATE: 62 BPM | DIASTOLIC BLOOD PRESSURE: 56 MMHG | OXYGEN SATURATION: 97 % | SYSTOLIC BLOOD PRESSURE: 108 MMHG

## 2023-05-03 DIAGNOSIS — I48.91 ATRIAL FIBRILLATION WITH RVR (H): ICD-10-CM

## 2023-05-03 DIAGNOSIS — R00.2 PALPITATIONS: ICD-10-CM

## 2023-05-03 LAB
HOLD SPECIMEN: NORMAL
LVEF ECHO: NORMAL
T3FREE SERPL-MCNC: 5.5 PG/ML (ref 2–4.4)
T4 FREE SERPL-MCNC: 1.68 NG/DL (ref 0.9–1.7)
TSH SERPL DL<=0.005 MIU/L-ACNC: <0.01 UIU/ML (ref 0.3–4.2)

## 2023-05-03 PROCEDURE — 76376 3D RENDER W/INTRP POSTPROCES: CPT

## 2023-05-03 PROCEDURE — 250N000011 HC RX IP 250 OP 636: Performed by: INTERNAL MEDICINE

## 2023-05-03 PROCEDURE — 250N000011 HC RX IP 250 OP 636: Performed by: STUDENT IN AN ORGANIZED HEALTH CARE EDUCATION/TRAINING PROGRAM

## 2023-05-03 PROCEDURE — 250N000009 HC RX 250: Performed by: STUDENT IN AN ORGANIZED HEALTH CARE EDUCATION/TRAINING PROGRAM

## 2023-05-03 PROCEDURE — 75572 CT HRT W/3D IMAGE: CPT | Mod: 26 | Performed by: INTERNAL MEDICINE

## 2023-05-03 PROCEDURE — 84481 FREE ASSAY (FT-3): CPT

## 2023-05-03 PROCEDURE — 76376 3D RENDER W/INTRP POSTPROCES: CPT | Mod: 26 | Performed by: STUDENT IN AN ORGANIZED HEALTH CARE EDUCATION/TRAINING PROGRAM

## 2023-05-03 PROCEDURE — 84443 ASSAY THYROID STIM HORMONE: CPT

## 2023-05-03 PROCEDURE — 93320 DOPPLER ECHO COMPLETE: CPT | Mod: 26 | Performed by: STUDENT IN AN ORGANIZED HEALTH CARE EDUCATION/TRAINING PROGRAM

## 2023-05-03 PROCEDURE — 84439 ASSAY OF FREE THYROXINE: CPT

## 2023-05-03 PROCEDURE — 93325 DOPPLER ECHO COLOR FLOW MAPG: CPT

## 2023-05-03 PROCEDURE — 93325 DOPPLER ECHO COLOR FLOW MAPG: CPT | Mod: 26 | Performed by: STUDENT IN AN ORGANIZED HEALTH CARE EDUCATION/TRAINING PROGRAM

## 2023-05-03 PROCEDURE — 75572 CT HRT W/3D IMAGE: CPT

## 2023-05-03 PROCEDURE — 93312 ECHO TRANSESOPHAGEAL: CPT | Mod: 26 | Performed by: STUDENT IN AN ORGANIZED HEALTH CARE EDUCATION/TRAINING PROGRAM

## 2023-05-03 PROCEDURE — 36415 COLL VENOUS BLD VENIPUNCTURE: CPT

## 2023-05-03 PROCEDURE — 258N000001 HC RX 258: Performed by: STUDENT IN AN ORGANIZED HEALTH CARE EDUCATION/TRAINING PROGRAM

## 2023-05-03 RX ORDER — LIDOCAINE HYDROCHLORIDE 20 MG/ML
15 SOLUTION OROPHARYNGEAL ONCE
Status: COMPLETED | OUTPATIENT
Start: 2023-05-03 | End: 2023-05-03

## 2023-05-03 RX ORDER — FLUMAZENIL 0.1 MG/ML
0.2 INJECTION, SOLUTION INTRAVENOUS
Status: DISCONTINUED | OUTPATIENT
Start: 2023-05-03 | End: 2023-05-04 | Stop reason: HOSPADM

## 2023-05-03 RX ORDER — ACYCLOVIR 200 MG/1
9.5 CAPSULE ORAL
Status: DISCONTINUED | OUTPATIENT
Start: 2023-05-03 | End: 2023-05-04 | Stop reason: HOSPADM

## 2023-05-03 RX ORDER — NALOXONE HYDROCHLORIDE 0.4 MG/ML
0.4 INJECTION, SOLUTION INTRAMUSCULAR; INTRAVENOUS; SUBCUTANEOUS
Status: DISCONTINUED | OUTPATIENT
Start: 2023-05-03 | End: 2023-05-04 | Stop reason: HOSPADM

## 2023-05-03 RX ORDER — NALOXONE HYDROCHLORIDE 0.4 MG/ML
0.2 INJECTION, SOLUTION INTRAMUSCULAR; INTRAVENOUS; SUBCUTANEOUS
Status: DISCONTINUED | OUTPATIENT
Start: 2023-05-03 | End: 2023-05-04 | Stop reason: HOSPADM

## 2023-05-03 RX ORDER — IOPAMIDOL 755 MG/ML
100 INJECTION, SOLUTION INTRAVASCULAR ONCE
Status: COMPLETED | OUTPATIENT
Start: 2023-05-03 | End: 2023-05-03

## 2023-05-03 RX ORDER — SODIUM CHLORIDE 9 MG/ML
INJECTION, SOLUTION INTRAVENOUS CONTINUOUS PRN
Status: DISCONTINUED | OUTPATIENT
Start: 2023-05-03 | End: 2023-05-04 | Stop reason: HOSPADM

## 2023-05-03 RX ORDER — LIDOCAINE 40 MG/G
CREAM TOPICAL
Status: DISCONTINUED | OUTPATIENT
Start: 2023-05-03 | End: 2023-05-04 | Stop reason: HOSPADM

## 2023-05-03 RX ORDER — FENTANYL CITRATE 50 UG/ML
25 INJECTION, SOLUTION INTRAMUSCULAR; INTRAVENOUS
Status: DISCONTINUED | OUTPATIENT
Start: 2023-05-03 | End: 2023-05-04 | Stop reason: HOSPADM

## 2023-05-03 RX ORDER — FENTANYL CITRATE 50 UG/ML
50 INJECTION, SOLUTION INTRAMUSCULAR; INTRAVENOUS ONCE
Status: COMPLETED | OUTPATIENT
Start: 2023-05-03 | End: 2023-05-03

## 2023-05-03 RX ADMIN — TOPICAL ANESTHETIC 0.5 ML: 200 SPRAY DENTAL; PERIODONTAL at 09:32

## 2023-05-03 RX ADMIN — FENTANYL CITRATE 50 MCG: 50 INJECTION, SOLUTION INTRAMUSCULAR; INTRAVENOUS at 09:38

## 2023-05-03 RX ADMIN — LIDOCAINE HYDROCHLORIDE 30 ML: 20 SOLUTION ORAL; TOPICAL at 09:32

## 2023-05-03 RX ADMIN — MIDAZOLAM 2 MG: 1 INJECTION, SOLUTION INTRAMUSCULAR; INTRAVENOUS at 09:38

## 2023-05-03 RX ADMIN — MIDAZOLAM 0.5 MG: 1 INJECTION, SOLUTION INTRAMUSCULAR; INTRAVENOUS at 09:41

## 2023-05-03 RX ADMIN — IOPAMIDOL 100 ML: 755 INJECTION, SOLUTION INTRAVENOUS at 10:35

## 2023-05-03 RX ADMIN — SODIUM CHLORIDE 5 ML: 9 INJECTION, SOLUTION INTRAMUSCULAR; INTRAVENOUS; SUBCUTANEOUS at 09:47

## 2023-05-03 NOTE — PROGRESS NOTES
Pt arrived in ECHO department for scheduled LUCAS.   Procedure explained, questions answered and consent signed. Discharge instructions discussed with patient.  Pt's throat sprayed at 0930, therefore pt will not be able to eat or drink until 2 hours after at 1130. Informed pt of this time and encouraged to start with warm fluids and soft foods.    Pt tolerated procedure well, and was given a total of 50 mcg IV fentanyl and 2.5 mg IV versed for conscious sedation.    LUCAS probe 62 used for procedure and inserted without difficulty.  Pt denied chest or throat pain after procedure and was monitored until fully awake and VSS. CT also completed. Pt Escorted out to front lobby by staff to meet pt's ride home.

## 2023-05-03 NOTE — PRE-PROCEDURE
GENERAL PRE-PROCEDURE:   Procedure:  Transesophageal echocardiogram with conscious sedation   Date/Time:  5/3/2023 9:52 AM    Verbal consent obtained?: No    Written consent obtained?: Yes    Risks and benefits: Risks, benefits and alternatives were discussed    Consent given by:  Patient  Patient states understanding of procedure being performed: Yes    Patient's understanding of procedure matches consent: Yes    Procedure consent matches procedure scheduled: Yes    Expected level of sedation:  Moderate  Appropriately NPO:  Yes  ASA Class:  2  Mallampati  :  Grade 3- soft palate visible, posterior pharyngeal wall not visible  Lungs:  Lungs clear with good breath sounds bilaterally  Heart:  Normal heart sounds and rate  History & Physical reviewed:  History and physical reviewed and no updates needed  Statement of review:  I have reviewed the lab findings, diagnostic data, medications, and the plan for sedation

## 2023-05-03 NOTE — OR NURSING
RE: Please call pt today regarding medication question before Ablation tomorrow.  Received: Today  Karlie Benitez RN Johnson, Judy, TARIQ; Mae Hutchinson, RN  Cc: Angeles Jamison  Sounds good! I will give her a call!     Chris Benitez RN   Cardiology Nurse Coordinator           Previous Messages     ----- Message -----   From: Ally Dowling RN   Sent: 5/3/2023   9:02 AM CDT   To: Angeles Jamison; Mae Hutchinson RN; *   Subject: Please call pt today regarding medication qu*     Hello,     Pt is scheduled for EP Ablation Focal AFIB tomorrow with Dr. Frye. Pt is not sure if she should take her Flecainide today or tomorrow but says if she does not take it, her heart will be fluttering all night.     Please call her today and let her know if she should take or hold Flecainide today and tomorrow.     Kind regards,     Ally Dowling RN, BSN   Pre-Anesthesia Screening   [phone] Office   [phone] Direct Line

## 2023-05-04 ENCOUNTER — ANESTHESIA (OUTPATIENT)
Dept: CARDIOLOGY | Facility: CLINIC | Age: 63
End: 2023-05-04
Payer: COMMERCIAL

## 2023-05-04 ENCOUNTER — HOSPITAL ENCOUNTER (OUTPATIENT)
Facility: CLINIC | Age: 63
Discharge: HOME | End: 2023-05-04
Attending: INTERNAL MEDICINE | Admitting: INTERNAL MEDICINE
Payer: COMMERCIAL

## 2023-05-04 VITALS
DIASTOLIC BLOOD PRESSURE: 66 MMHG | RESPIRATION RATE: 18 BRPM | OXYGEN SATURATION: 94 % | HEART RATE: 90 BPM | TEMPERATURE: 97.6 F | SYSTOLIC BLOOD PRESSURE: 130 MMHG

## 2023-05-04 DIAGNOSIS — I48.91 ATRIAL FIBRILLATION WITH RVR (H): ICD-10-CM

## 2023-05-04 LAB
ACT BLD: 152 SECONDS (ref 74–150)
ACT BLD: 152 SECONDS (ref 74–150)
ACT BLD: 377 SECONDS (ref 74–150)
ACT BLD: 424 SECONDS (ref 74–150)
ACT BLD: 429 SECONDS (ref 74–150)
ANION GAP SERPL CALCULATED.3IONS-SCNC: 13 MMOL/L (ref 7–15)
APTT PPP: 35 SECONDS (ref 22–38)
BUN SERPL-MCNC: 16.4 MG/DL (ref 8–23)
CALCIUM SERPL-MCNC: 10.1 MG/DL (ref 8.8–10.2)
CHLORIDE SERPL-SCNC: 102 MMOL/L (ref 98–107)
CREAT SERPL-MCNC: 0.62 MG/DL (ref 0.51–0.95)
DEPRECATED HCO3 PLAS-SCNC: 23 MMOL/L (ref 22–29)
ERYTHROCYTE [DISTWIDTH] IN BLOOD BY AUTOMATED COUNT: 13.4 % (ref 10–15)
GFR SERPL CREATININE-BSD FRML MDRD: >90 ML/MIN/1.73M2
GLUCOSE SERPL-MCNC: 119 MG/DL (ref 70–99)
HCT VFR BLD AUTO: 41.5 % (ref 35–47)
HGB BLD-MCNC: 13.2 G/DL (ref 11.7–15.7)
INR PPP: 1.47 (ref 0.85–1.15)
MAGNESIUM SERPL-MCNC: 1.7 MG/DL (ref 1.7–2.3)
MCH RBC QN AUTO: 28.9 PG (ref 26.5–33)
MCHC RBC AUTO-ENTMCNC: 31.8 G/DL (ref 31.5–36.5)
MCV RBC AUTO: 91 FL (ref 78–100)
PHOSPHATE SERPL-MCNC: 3.5 MG/DL (ref 2.5–4.5)
PLATELET # BLD AUTO: 312 10E3/UL (ref 150–450)
POTASSIUM SERPL-SCNC: 4.5 MMOL/L (ref 3.4–5.3)
RBC # BLD AUTO: 4.57 10E6/UL (ref 3.8–5.2)
SODIUM SERPL-SCNC: 138 MMOL/L (ref 136–145)
WBC # BLD AUTO: 5.6 10E3/UL (ref 4–11)

## 2023-05-04 PROCEDURE — C1759 CATH, INTRA ECHOCARDIOGRAPHY: HCPCS | Performed by: INTERNAL MEDICINE

## 2023-05-04 PROCEDURE — 93657 TX L/R ATRIAL FIB ADDL: CPT | Performed by: INTERNAL MEDICINE

## 2023-05-04 PROCEDURE — 272N000001 HC OR GENERAL SUPPLY STERILE: Performed by: INTERNAL MEDICINE

## 2023-05-04 PROCEDURE — C1730 CATH, EP, 19 OR FEW ELECT: HCPCS | Performed by: INTERNAL MEDICINE

## 2023-05-04 PROCEDURE — 250N000011 HC RX IP 250 OP 636

## 2023-05-04 PROCEDURE — 999N000054 HC STATISTIC EKG NON-CHARGEABLE

## 2023-05-04 PROCEDURE — C1732 CATH, EP, DIAG/ABL, 3D/VECT: HCPCS | Performed by: INTERNAL MEDICINE

## 2023-05-04 PROCEDURE — 36415 COLL VENOUS BLD VENIPUNCTURE: CPT | Performed by: NURSE PRACTITIONER

## 2023-05-04 PROCEDURE — 85027 COMPLETE CBC AUTOMATED: CPT | Performed by: NURSE PRACTITIONER

## 2023-05-04 PROCEDURE — 85610 PROTHROMBIN TIME: CPT | Performed by: NURSE PRACTITIONER

## 2023-05-04 PROCEDURE — 93010 ELECTROCARDIOGRAM REPORT: CPT | Performed by: INTERNAL MEDICINE

## 2023-05-04 PROCEDURE — 250N000009 HC RX 250: Performed by: INTERNAL MEDICINE

## 2023-05-04 PROCEDURE — 84100 ASSAY OF PHOSPHORUS: CPT | Performed by: NURSE PRACTITIONER

## 2023-05-04 PROCEDURE — 83735 ASSAY OF MAGNESIUM: CPT | Performed by: NURSE PRACTITIONER

## 2023-05-04 PROCEDURE — 85730 THROMBOPLASTIN TIME PARTIAL: CPT | Performed by: NURSE PRACTITIONER

## 2023-05-04 PROCEDURE — 258N000003 HC RX IP 258 OP 636

## 2023-05-04 PROCEDURE — 93657 TX L/R ATRIAL FIB ADDL: CPT | Mod: GC | Performed by: INTERNAL MEDICINE

## 2023-05-04 PROCEDURE — C1733 CATH, EP, OTHR THAN COOL-TIP: HCPCS | Performed by: INTERNAL MEDICINE

## 2023-05-04 PROCEDURE — 250N000009 HC RX 250

## 2023-05-04 PROCEDURE — 93656 COMPRE EP EVAL ABLTJ ATR FIB: CPT | Mod: GC | Performed by: INTERNAL MEDICINE

## 2023-05-04 PROCEDURE — 85347 COAGULATION TIME ACTIVATED: CPT

## 2023-05-04 PROCEDURE — 93656 COMPRE EP EVAL ABLTJ ATR FIB: CPT | Performed by: INTERNAL MEDICINE

## 2023-05-04 PROCEDURE — 250N000011 HC RX IP 250 OP 636: Performed by: INTERNAL MEDICINE

## 2023-05-04 PROCEDURE — 370N000017 HC ANESTHESIA TECHNICAL FEE, PER MIN: Performed by: INTERNAL MEDICINE

## 2023-05-04 PROCEDURE — 80048 BASIC METABOLIC PNL TOTAL CA: CPT | Performed by: NURSE PRACTITIONER

## 2023-05-04 PROCEDURE — C1894 INTRO/SHEATH, NON-LASER: HCPCS | Performed by: INTERNAL MEDICINE

## 2023-05-04 PROCEDURE — 93005 ELECTROCARDIOGRAM TRACING: CPT

## 2023-05-04 RX ORDER — NALOXONE HYDROCHLORIDE 0.4 MG/ML
0.4 INJECTION, SOLUTION INTRAMUSCULAR; INTRAVENOUS; SUBCUTANEOUS
Status: CANCELLED | OUTPATIENT
Start: 2023-05-04

## 2023-05-04 RX ORDER — THYROID 90 MG/1
1 TABLET ORAL DAILY
Status: CANCELLED | OUTPATIENT
Start: 2023-05-04

## 2023-05-04 RX ORDER — PROPOFOL 10 MG/ML
INJECTION, EMULSION INTRAVENOUS PRN
Status: DISCONTINUED | OUTPATIENT
Start: 2023-05-04 | End: 2023-05-04

## 2023-05-04 RX ORDER — NALOXONE HYDROCHLORIDE 0.4 MG/ML
0.2 INJECTION, SOLUTION INTRAMUSCULAR; INTRAVENOUS; SUBCUTANEOUS
Status: CANCELLED | OUTPATIENT
Start: 2023-05-04

## 2023-05-04 RX ORDER — FENTANYL CITRATE 50 UG/ML
INJECTION, SOLUTION INTRAMUSCULAR; INTRAVENOUS PRN
Status: DISCONTINUED | OUTPATIENT
Start: 2023-05-04 | End: 2023-05-04

## 2023-05-04 RX ORDER — ASPIRIN 81 MG/1
81 TABLET ORAL DAILY
Status: CANCELLED | OUTPATIENT
Start: 2023-05-04

## 2023-05-04 RX ORDER — PROTAMINE SULFATE 10 MG/ML
INJECTION, SOLUTION INTRAVENOUS PRN
Status: DISCONTINUED | OUTPATIENT
Start: 2023-05-04 | End: 2023-05-04

## 2023-05-04 RX ORDER — ADENOSINE 3 MG/ML
INJECTION, SOLUTION INTRAVENOUS
Status: DISCONTINUED | OUTPATIENT
Start: 2023-05-04 | End: 2023-05-04 | Stop reason: HOSPADM

## 2023-05-04 RX ORDER — SODIUM CHLORIDE, SODIUM LACTATE, POTASSIUM CHLORIDE, CALCIUM CHLORIDE 600; 310; 30; 20 MG/100ML; MG/100ML; MG/100ML; MG/100ML
INJECTION, SOLUTION INTRAVENOUS CONTINUOUS PRN
Status: DISCONTINUED | OUTPATIENT
Start: 2023-05-04 | End: 2023-05-04

## 2023-05-04 RX ORDER — LIDOCAINE 40 MG/G
CREAM TOPICAL
Status: DISCONTINUED | OUTPATIENT
Start: 2023-05-04 | End: 2023-05-04 | Stop reason: HOSPADM

## 2023-05-04 RX ORDER — OXYCODONE AND ACETAMINOPHEN 5; 325 MG/1; MG/1
1 TABLET ORAL EVERY 4 HOURS PRN
Status: CANCELLED | OUTPATIENT
Start: 2023-05-04

## 2023-05-04 RX ORDER — PHENYLEPHRINE HCL IN 0.9% NACL 50MG/250ML
.5-1.25 PLASTIC BAG, INJECTION (ML) INTRAVENOUS CONTINUOUS
Status: DISCONTINUED | OUTPATIENT
Start: 2023-05-04 | End: 2023-05-04 | Stop reason: HOSPADM

## 2023-05-04 RX ORDER — HEPARIN SODIUM 1000 [USP'U]/ML
INJECTION, SOLUTION INTRAVENOUS; SUBCUTANEOUS PRN
Status: DISCONTINUED | OUTPATIENT
Start: 2023-05-04 | End: 2023-05-04

## 2023-05-04 RX ORDER — DEXAMETHASONE SODIUM PHOSPHATE 4 MG/ML
INJECTION, SOLUTION INTRA-ARTICULAR; INTRALESIONAL; INTRAMUSCULAR; INTRAVENOUS; SOFT TISSUE PRN
Status: DISCONTINUED | OUTPATIENT
Start: 2023-05-04 | End: 2023-05-04

## 2023-05-04 RX ORDER — ONDANSETRON 2 MG/ML
INJECTION INTRAMUSCULAR; INTRAVENOUS PRN
Status: DISCONTINUED | OUTPATIENT
Start: 2023-05-04 | End: 2023-05-04

## 2023-05-04 RX ORDER — SODIUM CHLORIDE 9 MG/ML
INJECTION, SOLUTION INTRAVENOUS CONTINUOUS
Status: DISCONTINUED | OUTPATIENT
Start: 2023-05-04 | End: 2023-05-04 | Stop reason: HOSPADM

## 2023-05-04 RX ORDER — ONDANSETRON 4 MG/1
4 TABLET, FILM COATED ORAL EVERY 6 HOURS PRN
Status: CANCELLED | OUTPATIENT
Start: 2023-05-04

## 2023-05-04 RX ORDER — VILAZODONE HYDROCHLORIDE 40 MG/1
40 TABLET ORAL DAILY
Status: CANCELLED | OUTPATIENT
Start: 2023-05-04

## 2023-05-04 RX ADMIN — FENTANYL CITRATE 50 MCG: 50 INJECTION, SOLUTION INTRAMUSCULAR; INTRAVENOUS at 09:37

## 2023-05-04 RX ADMIN — ROCURONIUM BROMIDE 50 MG: 50 INJECTION, SOLUTION INTRAVENOUS at 08:42

## 2023-05-04 RX ADMIN — ROCURONIUM BROMIDE 20 MG: 50 INJECTION, SOLUTION INTRAVENOUS at 10:09

## 2023-05-04 RX ADMIN — FENTANYL CITRATE 50 MCG: 50 INJECTION, SOLUTION INTRAMUSCULAR; INTRAVENOUS at 10:09

## 2023-05-04 RX ADMIN — ONDANSETRON 4 MG: 2 INJECTION INTRAMUSCULAR; INTRAVENOUS at 11:15

## 2023-05-04 RX ADMIN — DEXAMETHASONE SODIUM PHOSPHATE 4 MG: 4 INJECTION, SOLUTION INTRA-ARTICULAR; INTRALESIONAL; INTRAMUSCULAR; INTRAVENOUS; SOFT TISSUE at 08:57

## 2023-05-04 RX ADMIN — ROCURONIUM BROMIDE 20 MG: 50 INJECTION, SOLUTION INTRAVENOUS at 10:44

## 2023-05-04 RX ADMIN — ROCURONIUM BROMIDE 20 MG: 50 INJECTION, SOLUTION INTRAVENOUS at 09:09

## 2023-05-04 RX ADMIN — SUGAMMADEX 200 MG: 100 INJECTION, SOLUTION INTRAVENOUS at 11:35

## 2023-05-04 RX ADMIN — MIDAZOLAM 2 MG: 1 INJECTION INTRAMUSCULAR; INTRAVENOUS at 08:27

## 2023-05-04 RX ADMIN — SODIUM CHLORIDE, POTASSIUM CHLORIDE, SODIUM LACTATE AND CALCIUM CHLORIDE: 600; 310; 30; 20 INJECTION, SOLUTION INTRAVENOUS at 08:32

## 2023-05-04 RX ADMIN — HEPARIN SODIUM 12000 UNITS: 1000 INJECTION INTRAVENOUS; SUBCUTANEOUS at 09:31

## 2023-05-04 RX ADMIN — PROTAMINE SULFATE 40 MG: 10 INJECTION, SOLUTION INTRAVENOUS at 11:08

## 2023-05-04 RX ADMIN — PROTAMINE SULFATE 10 MG: 10 INJECTION, SOLUTION INTRAVENOUS at 11:04

## 2023-05-04 RX ADMIN — PROPOFOL 120 MG: 10 INJECTION, EMULSION INTRAVENOUS at 08:42

## 2023-05-04 RX ADMIN — ROCURONIUM BROMIDE 30 MG: 50 INJECTION, SOLUTION INTRAVENOUS at 09:37

## 2023-05-04 RX ADMIN — HEPARIN SODIUM 4000 UNITS: 1000 INJECTION INTRAVENOUS; SUBCUTANEOUS at 10:15

## 2023-05-04 ASSESSMENT — ACTIVITIES OF DAILY LIVING (ADL)
ADLS_ACUITY_SCORE: 35
ADLS_ACUITY_SCORE: 33
ADLS_ACUITY_SCORE: 35

## 2023-05-04 ASSESSMENT — ENCOUNTER SYMPTOMS
DYSRHYTHMIAS: 1
SEIZURES: 0

## 2023-05-04 ASSESSMENT — LIFESTYLE VARIABLES: TOBACCO_USE: 0

## 2023-05-04 ASSESSMENT — COPD QUESTIONNAIRES: COPD: 0

## 2023-05-04 NOTE — ANESTHESIA PREPROCEDURE EVALUATION
Anesthesia Pre-Procedure Evaluation    Patient: Andree Garcia   MRN: [id number] : 1960        Procedure : Procedure(s):  EP Ablation Focal AFIB          Past Medical History:   Diagnosis Date     Anxiety     developed with menopause     Chest pain     non cardiac     Costochondritis      Hyperlipidemia      Lyme disease       Past Surgical History:   Procedure Laterality Date     COLONOSCOPY  2012    Procedure: COLONOSCOPY;  COLONOSCOPY,  ESOPHAGOSCOPY, GASTROSCOPY, DUODENOSCOPY;  Surgeon: Dominic Gray MD;  Location:  GI     DILATION AND CURETTAGE       ESOPHAGOSCOPY, GASTROSCOPY, DUODENOSCOPY (EGD), COMBINED  2012    Procedure: COMBINED ESOPHAGOSCOPY, GASTROSCOPY, DUODENOSCOPY (EGD);;  Surgeon: Dominic Gray MD;  Location:  GI     EXCISE CONDYLOMA RECTUM       uterine polypectomy        Allergies   Allergen Reactions     Sulfa Antibiotics      Hives      Social History     Tobacco Use     Smoking status: Never     Smokeless tobacco: Never   Vaping Use     Vaping status: Not on file   Substance Use Topics     Alcohol use: Yes     Comment: social - maybe 3 times week      Wt Readings from Last 1 Encounters:   23 69.1 kg (152 lb 4.8 oz)        Anesthesia Evaluation            ROS/MED HX  ENT/Pulmonary:  - neg pulmonary ROS  (-) tobacco use, asthma, COPD, sleep apnea, STEFANIA risk factors, recent URI, cystic fibrosis, allergic rhinitis and vocal abnormalities   Neurologic:     (+) no peripheral neuropathy migraines,  (-) no seizures, no CVA, no TIA, Delerium and Dementia   Cardiovascular:     (+) -----dysrhythmias (on flecaninide. last taken 23), a-fib,  (-) hypertension and syncope   METS/Exercise Tolerance: >4 METS    Hematologic: Comments: On Rivaroxaban for Afib. Last dose today   (-) history of blood clots   Musculoskeletal: Comment: Pt reports TMJ-like symptoms   (-) muscular dystrophy and arthritis   GI/Hepatic:       Renal/Genitourinary:  - neg Renal ROS  (-) renal disease,  nephrolithiasis, BPH and History of transplant   Endo:     (+) thyroid problem, hypothyroidism,     Psychiatric/Substance Use:     (+) psychiatric history anxiety  (-) alcohol abuse history and chronic opioid use history   Infectious Disease:  - neg infectious disease ROS  (-) Recent Fever, SBE Prophylaxis, MRSA and VRE   Malignancy:    (-) malignancy   Other:            Physical Exam    Airway      Comment: Reports locked jaw symptoms    Mallampati: II   TM distance: > 3 FB   Neck ROM: full   Mouth opening: > 3 cm    Respiratory Devices and Support         Dental       (+) Completely normal teeth      Cardiovascular          Rhythm and rate: regular     Pulmonary           breath sounds clear to auscultation           OUTSIDE LABS:  CBC:   Lab Results   Component Value Date    WBC 5.2 03/07/2023    WBC 12.1 (H) 03/06/2023    HGB 12.2 03/07/2023    HGB 15.3 03/06/2023    HCT 38.8 03/07/2023    HCT 46.3 03/06/2023     03/07/2023     (H) 03/06/2023     BMP:   Lab Results   Component Value Date     03/07/2023     03/06/2023    POTASSIUM 3.7 03/07/2023    POTASSIUM 4.1 03/06/2023    CHLORIDE 104 03/07/2023    CHLORIDE 104 03/06/2023    CO2 21 (L) 03/07/2023    CO2 16 (L) 03/06/2023    BUN 18.1 03/07/2023    BUN 22.5 03/06/2023    CR 0.60 03/07/2023    CR 0.72 03/06/2023     (H) 03/07/2023     (H) 03/06/2023     COAGS:   Lab Results   Component Value Date    PTT 28 03/06/2023    INR 1.00 03/06/2023     POC: No results found for: BGM, HCG, HCGS  HEPATIC:   Lab Results   Component Value Date    ALBUMIN 3.7 03/07/2023    PROTTOTAL 6.4 03/07/2023    ALT 28 03/07/2023    AST 29 03/07/2023    ALKPHOS 62 03/07/2023    BILITOTAL 0.3 03/07/2023     OTHER:   Lab Results   Component Value Date    LACT 0.9 03/06/2023    A1C 6.4 (H) 03/06/2023    PRITI 8.4 (L) 03/07/2023    PHOS 3.5 08/10/2012    MAG 3.6 (H) 03/07/2023    LIPASE 29 03/06/2023    TSH <0.01 (L) 05/03/2023    T4 1.68 05/03/2023     T3 95 06/29/2012    CRP <5.0 08/10/2012    SED 17 06/29/2012       Anesthesia Plan    ASA Status:  2   NPO Status:  NPO Appropriate    Anesthesia Type: General.     - Airway: ETT   Induction: Propofol, Intravenous.   Maintenance: Balanced.   Techniques and Equipment:     - Lines/Monitors: Arterial Line     Consents    Anesthesia Plan(s) and associated risks, benefits, and realistic alternatives discussed. Questions answered and patient/representative(s) expressed understanding.     - Discussed: Risks, Benefits and Alternatives for the PROCEDURE were discussed     - Discussed with:  Patient    Use of blood products discussed: No .     Postoperative Care    Pain management: IV analgesics.   PONV prophylaxis: Dexamethasone or Solumedrol, Ondansetron (or other 5HT-3)     Comments:                Soham Morales MD

## 2023-05-04 NOTE — PROGRESS NOTES
/66   Pulse 90   Temp 97.6  F (36.4  C) (Oral)   Resp 18   LMP 06/19/2012   SpO2 94%   Condition is stable s/p A-fib ablation. Vital Signs are stable/WNL. Right groin site clean, dry and intact, cms intact.  Discharge instructions reviewed with patient and questions answered. Patient verbalizes understanding. IV removed. Pain under control. Patient is ambulating and voiding spontaneously. Patient is tolerating regular diet and denies any N/V. Patient to be discharged to home via . Patient has all belongings.

## 2023-05-04 NOTE — ANESTHESIA POSTPROCEDURE EVALUATION
Patient: Andree Garcia    Procedure: Procedure(s):  EP Ablation Focal AFIB       Anesthesia Type:  General    Note:  Disposition: Outpatient   Postop Pain Control: Uneventful            Sign Out: Well controlled pain   PONV: No   Neuro/Psych: Uneventful            Sign Out: Acceptable/Baseline neuro status   Airway/Respiratory: Uneventful            Sign Out: Acceptable/Baseline resp. status   CV/Hemodynamics: Uneventful            Sign Out: Acceptable CV status; No obvious hypovolemia; No obvious fluid overload   Other NRE: NONE   DID A NON-ROUTINE EVENT OCCUR? No           Last vitals:  Vitals Value Taken Time   /61 05/04/23 1220   Temp     Pulse 84 05/04/23 1223   Resp     SpO2 96 % 05/04/23 1224   Vitals shown include unvalidated device data.    Electronically Signed By: Subhash Noel MD  May 4, 2023  12:26 PM

## 2023-05-04 NOTE — ANESTHESIA PROCEDURE NOTES
Airway       Patient location during procedure: OR       Procedure Start/Stop Times: 5/4/2023 8:51 AM  Staff -        Anesthesiologist:  Soham Morales MD       CRNA: iNcolas Ochoa APRN CRNA       Performed By: CRNA  Consent for Airway        Urgency: elective  Indications and Patient Condition       Indications for airway management: skyla-procedural       Induction type:intravenous       Mask difficulty assessment: 1 - vent by mask    Final Airway Details       Final airway type: endotracheal airway       Successful airway: ETT - single  Endotracheal Airway Details        ETT size (mm): 6.5       Cuffed: yes       Successful intubation technique: direct laryngoscopy       DL Blade Type: Hurley 2       Grade View of Cords: 1       Adjucts: stylet       Position: Right       Measured from: lips       Secured at (cm): 21       Bite block used: None    Post intubation assessment        Placement verified by: capnometry and equal breath sounds        Number of attempts at approach: 1       Number of other approaches attempted: 0       Secured with: silk tape       Ease of procedure: easy       Dentition: Intact and Unchanged    Medication(s) Administered   Medication Administration Time: 5/4/2023 8:51 AM

## 2023-05-04 NOTE — DISCHARGE INSTRUCTIONS
Post-Ablation Discharge Instructions    Per Dr. Frye can stop Xarelto in 4 weeks and switch to Aspirin 81 mg daily  Continue Flecainide until follow up visit    Care of groin site:        Remove the Band-Aid after 24 hours. If there is minor oozing, apply another Band-aid and remove it after 12 hours.         Do NOT take a bath, use a hot tub, pool, or submerse in water for at least 3 days You may shower.         It is normal to have a small bruise or lump at the site.        Do not scrub the site.        Do not use lotion or powder near the puncture site for 3 days.        For the first 2 days: Do not stoop or squat. When you cough, sneeze or move your bowels, hold your hand over the puncture site and press gently.        Do not lift more than 10 pounds or exertional activity for 10 days.      If you start bleeding from the site in your groin:  Lie down flat and press firmly on the site.  Call your physician immediately, or, come to the emergency room.    Call 911 right away if you have bleeding that is heavy or does not stop.     Call your doctor/provider if:        You have a large or growing hard lump around the site.        The site is red, swollen, hot or tender.        Blood or fluid is draining from the site.        You have chills or a fever greater than 101 F (38 C).        Your leg or arm turns bluish, feels numb or cool.        You have hives, a rash or unusual itching.     Cardiovascular Clinic:   [address] [place] [address]. [place] [address]    Your Care Team:  EP Cardiology   Telephone Number     Nurses:   Laurie GRANADOS [phone]    After business hours: [phone], select option 4, and ask for EP Fellow on-call to be paged.   Non-procedure scheduling:    Mira TRIPLETT   [phone]   Procedure scheduling:    Marcela Jamison   [phone]   Device Clinic (Pacemakers, ICDs, Loop Recorders)    During business hours: [phone]  After business hours:   [phone]- select option 4  and ask for job code [id number].       As always, Thank you for trusting us with your health care needs

## 2023-05-04 NOTE — ANESTHESIA CARE TRANSFER NOTE
Patient: Andree Garcia    Procedure: Procedure(s):  EP Ablation Focal AFIB       Diagnosis: Afib  Diagnosis Additional Information: No value filed.    Anesthesia Type:   General     Note:    Oropharynx: oropharynx clear of all foreign objects and spontaneously breathing  Level of Consciousness: awake  Oxygen Supplementation: nasal cannula  Level of Supplemental Oxygen (L/min / FiO2): 2  Independent Airway: airway patency satisfactory and stable  Dentition: dentition unchanged  Vital Signs Stable: post-procedure vital signs reviewed and stable  Report to RN Given: handoff report given  Patient transferred to: PACU    Handoff Report: Identifed the Patient, Identified the Reponsible Provider, Reviewed the pertinent medical history, Discussed the surgical course, Reviewed Intra-OP anesthesia mangement and issues during anesthesia, Set expectations for post-procedure period and Allowed opportunity for questions and acknowledgement of understanding      Vitals:  Vitals Value Taken Time   /57 05/04/23 1149   Temp     Pulse 90 05/04/23 1152   Resp 16    SpO2 96 % 05/04/23 1152   Vitals shown include unvalidated device data.    Electronically Signed By: TANYA Tomas CRNA  May 4, 2023  11:53 AM

## 2023-05-04 NOTE — H&P
Electrophysiology Pre-Procedure History and Physical    Andree Garcia MRN# [id number]   Age: 62 year old YOB: 1960      Date of Procedure: 5/4/2023  [facility]      Date of Exam 5/4/2023 Facility (Same day)       Home clinic: [facility]  Primary care provider: Sigrid Porter  HPI:  Ms. Garcia is a 62 year old female who has a medical history significant for PAF, HLD, hypothyroidism, Lyme disease, costochondritis, recurrent UTIs and anxiety.     Over the past couple she has been having palpitations several times per day. These palpitations come on suddenly for 30 minutes to an hour at a time. She will sometimes wake up in the morning and have these palpitations. She was found to have AF. She saw Dr. Frye in clinic who discussed management options and she elected to pursue ablation for which she presents today.        Active problem list:     Patient Active Problem List    Diagnosis Date Noted     Abdominal pain, left lower quadrant 03/06/2023     Priority: Medium     Hypomagnesemia 03/06/2023     Priority: Medium     Elevated troponin 03/06/2023     Priority: Medium     Atrial fibrillation with RVR (H) 03/06/2023     Priority: Medium     Elevated brain natriuretic peptide (BNP) level 03/06/2023     Priority: Medium     Low TSH level 03/06/2023     Priority: Medium     High anion gap metabolic acidosis 03/06/2023     Priority: Medium     Nausea vomiting and diarrhea 03/06/2023     Priority: Medium     Leukocytosis, unspecified type 03/06/2023     Priority: Medium     Lyme disease 08/18/2013     Priority: Medium     Overview:   History of diagnosis and prolonged antibiotic treatment course       GERD (gastroesophageal reflux disease) 05/01/2013     Priority: Medium     CARDIOVASCULAR SCREENING; LDL GOAL LESS THAN 160 06/27/2012     Priority: Medium            Medications (include herbals and vitamins):      Current  Facility-Administered Medications   Medication     phenylephrine (DEV-SYNEPHRINE) 50 mg in NaCl 0.9 % 250 mL infusion PERIPHERAL           Medication List      There are no discharge medications for this visit.              Allergies:      Allergies   Allergen Reactions     Sulfa Antibiotics      Hives             Social History:     Social History     Tobacco Use     Smoking status: Never     Smokeless tobacco: Never   Vaping Use     Vaping status: Not on file   Substance Use Topics     Alcohol use: Yes     Comment: social - maybe 3 times week            Physical Exam:   All vitals have been reviewed  No data found.  No intake/output data recorded.  Airway assessment:   Patient is able to open mouth wide  Patient is able to stick out tongue}      ENT:   Normocephalic, without obvious abnormality, atraumatic, sinuses nontender on palpation, external ears without lesions, oral pharynx with moist mucous membranes, tonsils without erythema or exudates, gums normal and good dentition.     Neck:   Supple, symmetrical, trachea midline, no adenopathy, thyroid symmetric, not enlarged and no tenderness, skin normal     Lungs:   No increased work of breathing, good air exchange, clear to auscultation bilaterally, no crackles or wheezing     Cardiovascular:   Normal apical impulse, regular rate and rhythm, normal S1 and S2, no S3 or S4, and no murmur noted             Lab / Radiology Results:     Lab Results   Component Value Date    WBC 5.2 03/07/2023    WBC 5.0 08/10/2012    RBC 4.19 03/07/2023    RBC 3.89 08/10/2012    HGB 12.2 03/07/2023    HGB 11.9 08/10/2012    HCT 38.8 03/07/2023    HCT 35.9 08/10/2012    MCV 93 03/07/2023    MCV 92 08/10/2012    RDW 12.1 03/07/2023    RDW 13.4 08/10/2012     03/07/2023     08/10/2012      Lab Results   Component Value Date    WBC 5.2 03/07/2023    WBC 5.0 08/10/2012     Lab Results   Component Value Date     03/07/2023     08/10/2012     Lab Results    Component Value Date    HGB 12.2 03/07/2023    HGB 11.9 08/10/2012    HCT 38.8 03/07/2023    HCT 35.9 08/10/2012     Lab Results   Component Value Date     03/07/2023     08/10/2012    CO2 21 03/07/2023    CO2 27 08/10/2012    BUN 18.1 03/07/2023    BUN 13 08/10/2012     Lab Results   Component Value Date     03/07/2023     08/10/2012    CO2 21 03/07/2023    CO2 27 08/10/2012    BUN 18.1 03/07/2023    BUN 13 08/10/2012     Lab Results   Component Value Date     03/07/2023     08/10/2012     Lab Results   Component Value Date    BUN 18.1 03/07/2023    BUN 13 08/10/2012     Lab Results   Component Value Date    TSH <0.01 05/03/2023    TSH <0.02 12/14/2012             Plan:   Patient's active problems diagnostically and therapeutically optimized for the planned procedure. Patient here for AF ablation. Procedure explained in detail to patient including indications, risks, and benefits. She states understanding and wishes to procced.     TANYA Topete CNP  Electrophysiology Consult Service  Pager: [phone]

## 2023-05-04 NOTE — PROGRESS NOTES
D/I/A: Pt roomed on 3C in bay [address].  Arrived via litter and accompanied by PACU RN and  Jeff On/Off: On monitor.  VSSA.  Rhythm upon arrival NSR on monitor.  Denies pain or sob.  Reviewed activity restrictions and when to notify RN, ie-changes to breathing or increased chest pressure or chest pain.  CCL access:  Right groin-CDI, soft-4 hour bedrest complete at 1545. Patient complains of left shoulder/scapula pain-applied pillow and heat pack.  P: Continue to monitor status.  Discharge to home once meeting criteria.

## 2023-05-05 LAB
ATRIAL RATE - MUSE: 61 BPM
ATRIAL RATE - MUSE: 84 BPM
DIASTOLIC BLOOD PRESSURE - MUSE: NORMAL MMHG
DIASTOLIC BLOOD PRESSURE - MUSE: NORMAL MMHG
INTERPRETATION ECG - MUSE: NORMAL
INTERPRETATION ECG - MUSE: NORMAL
P AXIS - MUSE: 54 DEGREES
P AXIS - MUSE: 62 DEGREES
PR INTERVAL - MUSE: 178 MS
PR INTERVAL - MUSE: 196 MS
QRS DURATION - MUSE: 72 MS
QRS DURATION - MUSE: 74 MS
QT - MUSE: 392 MS
QT - MUSE: 426 MS
QTC - MUSE: 428 MS
QTC - MUSE: 463 MS
R AXIS - MUSE: 0 DEGREES
R AXIS - MUSE: 4 DEGREES
SYSTOLIC BLOOD PRESSURE - MUSE: NORMAL MMHG
SYSTOLIC BLOOD PRESSURE - MUSE: NORMAL MMHG
T AXIS - MUSE: 34 DEGREES
T AXIS - MUSE: 7 DEGREES
VENTRICULAR RATE- MUSE: 61 BPM
VENTRICULAR RATE- MUSE: 84 BPM

## 2023-05-09 ENCOUNTER — TELEPHONE (OUTPATIENT)
Dept: CARDIOLOGY | Facility: CLINIC | Age: 63
End: 2023-05-09
Payer: COMMERCIAL

## 2023-05-09 NOTE — TELEPHONE ENCOUNTER
Attempted to call pt to check in on how she is feeling after AFib ablation last week. Pt did not answer but left VM requesting she call us back with any questions or concerns.     Chris Benitez, RN   Cardiology Nurse Coordinator

## 2023-05-12 NOTE — TELEPHONE ENCOUNTER
[facility] Call Center    Phone Message    May a detailed message be left on voicemail: yes     Reason for Call: Other: Pt would like a call back to discuss a referral from Dr. Melva shaw an endo    Pt also stated she can't get into [facility] mychart and the techs are looking into it so she would like a call back    Action Taken: Message routed to:  [facility] ([facility]): Cardio    Travel Screening: Not Applicable

## 2023-05-13 NOTE — TELEPHONE ENCOUNTER
Endocrine triage  Abnormal TFTS are due to Desiccated thyroid extract    To schedulers : please offer to move forward on schedule with Dr Mcmanus , Dr Garcia, or Dr Marley non-call week open new/MARILYN (60 minutes) - OR OK to use 2 back to back 30 minute MARILYN spots for a one hour new patient visit,     Christa Claire MD  Endocrine triage

## 2023-05-15 NOTE — TELEPHONE ENCOUNTER
Patient call:     Appointment type: new endocrine   Provider: Jenna  Return date:9/5/2023  Speciality phone number: [phone]  Additional appointment(s) needed:   Additional notes: LVM and sent Myc   please offer to move forward on schedule with Dr Mcmanus , Dr Garcia, or Dr Marley non-call week open new/MARILYN (60 minutes) - OR OK to use 2 back to back 30 minute MARILYN spots for a one hour new patient visit  Yung Pat on 5/15/2023 at 4:18 PM

## 2023-05-16 NOTE — TELEPHONE ENCOUNTER
Left VM for pt stating Dr. Frye did not have any specific endocrine providers in mind for endo referral that was placed by Dr. Ulloa. I did notify pt that endo appt is set up in September. Provided pt with Endo phone number and suggested that she ask Dr. Ulloa if they had a recommendation for her since referral was placed by them.     Chris Benitez, RN   Cardiology Nurse Coordinator

## 2023-05-19 NOTE — TELEPHONE ENCOUNTER
RECORDS RECEIVED FROM: internal    DATE RECEIVED: 6.21.23   NOTES (FOR ALL VISITS) STATUS DETAILS   OFFICE NOTES from referring provider internal    Dr Ulloa     ED NOTES internal  3.6.23 Artemio     MEDICATION LIST internal     IMAGING        XR (Chest) internal  3.6.23   CT (HEAD/NECK/CHEST/ABDOMEN) internal  3.6.23      LABS     DIABETES: HBGA1C, CREATININE, FASTING LIPIDS, MICROALBUMIN URINE, POTASSIUM, TSH, T4    THYROID: TSH, T4, CBC, THYRODLONULIN, TOTAL T3, FREE T4, CALCITONIN, CEA internal  Cbc- 5.4.23  BMP- 5.4.23  TSH-5.3.23  T4- 5.3.23  CMP- 3.7.23  HBGA1C- 3.6.23

## 2023-06-01 ENCOUNTER — LAB REQUISITION (OUTPATIENT)
Dept: LAB | Facility: CLINIC | Age: 63
End: 2023-06-01
Payer: COMMERCIAL

## 2023-06-01 DIAGNOSIS — Z01.419 ENCOUNTER FOR GYNECOLOGICAL EXAMINATION (GENERAL) (ROUTINE) WITHOUT ABNORMAL FINDINGS: ICD-10-CM

## 2023-06-01 PROCEDURE — G0145 SCR C/V CYTO,THINLAYER,RESCR: HCPCS | Mod: ORL | Performed by: OBSTETRICS & GYNECOLOGY

## 2023-06-01 PROCEDURE — 87624 HPV HI-RISK TYP POOLED RSLT: CPT | Mod: ORL | Performed by: OBSTETRICS & GYNECOLOGY

## 2023-06-06 LAB
BKR LAB AP GYN ADEQUACY: NORMAL
BKR LAB AP GYN INTERPRETATION: NORMAL
BKR LAB AP HPV REFLEX: NORMAL
BKR LAB AP LMP: NORMAL
BKR LAB AP PREVIOUS ABNL DX: NORMAL
BKR LAB AP PREVIOUS ABNORMAL: NORMAL
PATH REPORT.COMMENTS IMP SPEC: NORMAL
PATH REPORT.COMMENTS IMP SPEC: NORMAL
PATH REPORT.RELEVANT HX SPEC: NORMAL

## 2023-06-07 LAB
HUMAN PAPILLOMA VIRUS 16 DNA: NEGATIVE
HUMAN PAPILLOMA VIRUS 18 DNA: NEGATIVE
HUMAN PAPILLOMA VIRUS FINAL DIAGNOSIS: NORMAL
HUMAN PAPILLOMA VIRUS OTHER HR: NEGATIVE

## 2023-06-20 NOTE — PROGRESS NOTES
Endocrine Clinic Consult      Andree Garcia is a 62 year old female who is being evaluated via a billable video visit.        Consulting provider: Juve Ulloa MD  [address] [place] [address]    Reason for consultation: hot flashes, hypothyroidism      Assessment:  Middle aged woman with history of hypothyroidism with comorbidity of depression with recent episode of atrial fibrillation.  It appears the patient had been overtreated for hypothyroidism which is associated with increased risk of atrial fibrillation.  Presents with main concern of diaphoresis for which patient has risk of being overtreated with thyroid hormones and also A1c in the high prediabetic range.  Patient denies any spells, chest tightness and headaches as well as anxiety therefore diaphoresis reflects pheochromocytoma.  The sweating also does not appear to be postmenopausal hot flashes as they are consistently throughout the day different from shortness she experienced before and patient had been on 1 mg of estrogen which typically treats hot flashes well.    Considering abdominal weight gain and fullness in her face as well as increase in glucose reasonable to rule out cortisol excess although patient lacks true moon facies, acne and violaceous striae.  Patient's presentation is most consistent with iatrogenic thyrotoxicosis with diaphoresis and atrial fibrillation and postmenopausal the central weight gain during time of significant social stress.      Menopause  Patient is about 7 years postmenopausal, estrogen was started for night sweats interfering with sleep which is very reasonable, agree with dose adjustment of estradiol to 0.5 mg, need to combine with progesterone for uterine protection    Plan:   Switch NP thyroid 90 mg to levothyroxine 137 mcg daily  Evaluate cortisol excess with 1 mg dexamethasone suppression  Rule out medullary thyroid tumor and growth hormone excess with calcitonin check for further evaluation  of sweating    Yue Marley MD  Endocrinology and Diabetes  Telephone contact:  [facility] [place] [phone]  [facility] [place] [phone]                HPI:   Andree Garcia is a 62 year old woman coming in regards to history of hypothyroidism on thyroid hormone replacement and concern of postmenopausal changes.  Patient is mostly concerned about feeling sweaty and clammy almost all day long even when in air conditioning for the last 2 to 3 months.  It is different from hot flashes that she experienced postmenopausal which were not total mild and intermittent also severe enough to wake her up from sleep.  Patient does feel clammy head to toe.  No associated symptoms particularly no headaches chest tightness anxiety.    Patient had developed palpitations and found to be in atrial fibrillation, had ablation which has been successful.  No further palpitations since    Patient notes that she had recent weight gain over the last 5 to 7 years of 10 to 20 pounds.  Of note weight in our system was noted at 153 pounds in 2014 which is almost the same as today.    Hypothyroid since 2018  Due to Hashimoto's thyroiditis    Thyroid medications: NP Thyroid 120 mg decreased to 90 mg daily 6 weeks ago  Takes thyroid medication: In the morning with water does not eat for 30 minutes    Menopause age 55 years -         Symptoms of hypo- and hyperthyroidism:  Weight change 15lbs wieght gain last 15 months; heat or cold intolerance heat; abnormal bowel movements no; hair loss no, change in skin pattern skin rahses; anxiety lots of stress family, depression n; heart racing resolved; tremors no  Symptoms in the anterior neck: dysphagia no; dysphonia no; pain no;    Family history of thyroid disease in her mother was diagnosed in her 80s    Current Problem List:   Patient Active Problem List   Diagnosis     CARDIOVASCULAR SCREENING; LDL GOAL LESS THAN 160     GERD (gastroesophageal reflux  disease)     Abdominal pain, left lower quadrant     Hypomagnesemia     Elevated troponin     Atrial fibrillation with RVR (H)     Elevated brain natriuretic peptide (BNP) level     Low TSH level     High anion gap metabolic acidosis     Nausea vomiting and diarrhea     Leukocytosis, unspecified type     Lyme disease             Past Medical and Past Surgical History:  Past Medical History:   Diagnosis Date     Anxiety     developed with menopause     Chest pain     non cardiac     Costochondritis      Hyperlipidemia      Lyme disease        Past Surgical History:   Procedure Laterality Date     COLONOSCOPY  9/11/2012    Procedure: COLONOSCOPY;  COLONOSCOPY,  ESOPHAGOSCOPY, GASTROSCOPY, DUODENOSCOPY;  Surgeon: Dominic Gray MD;  Location:  GI     DILATION AND CURETTAGE       EP ABLATION FOCAL AFIB N/A 5/4/2023    Procedure: EP Ablation Focal AFIB;  Surgeon: Geni Frye MD;  Location:  HEART CARDIAC CATH LAB     ESOPHAGOSCOPY, GASTROSCOPY, DUODENOSCOPY (EGD), COMBINED  9/11/2012    Procedure: COMBINED ESOPHAGOSCOPY, GASTROSCOPY, DUODENOSCOPY (EGD);;  Surgeon: Dominic Gray MD;  Location:  GI     EXCISE CONDYLOMA RECTUM       uterine polypectomy         Medications:   Current Outpatient Medications   Medication Sig Dispense Refill     aspirin (ASA) 81 MG EC tablet Take 1 tablet (81 mg) by mouth daily 90 tablet 3     Cholecalciferol (VITAMIN D3) Take 1,000 Units by mouth daily       diltiazem (CARDIZEM) 30 MG tablet Take 1 tablet (30 mg) by mouth 4 times daily 120 tablet 0     estradiol (ESTRACE VAGINAL) 0.1 MG/GM vaginal cream Apply small amount to the vaginal opening and urethra M, W, F @ h.s. 42.5 g 3     estradiol (ESTRACE VAGINAL) 0.1 MG/GM vaginal cream Apply small amount to the vaginal opening and urethra M, W, F @ h.s. 42.5 g 3     flecainide (TAMBOCOR) 50 MG tablet Take 1 tablet (50 mg) by mouth 2 times daily 120 tablet 1     Multiple Vitamin (MULTIVITAMIN OR) Take 1 tablet by mouth daily        NP THYROID 90 MG tablet Take 1.5 tablets (135 mg) by mouth daily at 2 pm       Omega-3 Fatty Acids (OMEGA III EPA+DHA PO) Take 1 capsule by mouth daily       ondansetron (ZOFRAN) 4 MG tablet Take 1 tablet (4 mg) by mouth every 6 hours as needed for nausea or vomiting 12 tablet 0     progesterone (PROMETRIUM) 100 MG capsule Take 100 mg by mouth daily       rivaroxaban ANTICOAGULANT (XARELTO) 20 MG TABS tablet Take 1 tablet (20 mg) by mouth daily (with dinner) Please start 2 weeks prior to procedure and continue for 4 weeks afterwards. 60 tablet 0     VIIBRYD 40 MG TABS tablet TAKE 1 TABLET BY MOUTH EVERY DAY WITH FOOD         Allergies:   Allergies   Allergen Reactions     Sulfa Antibiotics      Hives       Social History     Tobacco Use     Smoking status: Never     Smokeless tobacco: Never   Vaping Use     Vaping status: Not on file   Substance Use Topics     Alcohol use: Yes     Comment: social - maybe 3 times week       Family History   Problem Relation Age of Onset     Cardiovascular Father      Cerebrovascular Disease Father      C.A.D. Father      Arthritis Brother 15        juvenile arthritis     Breast Cancer Sister 50        breast cancer - in complete remission     Thyroid Disease Mother      Other - See Comments Sister         brain tumor survivor       Physical Examination:  /81 (BP Location: Right arm, Patient Position: Sitting, Cuff Size: Adult Regular)   Pulse 74   Wt 70.8 kg (156 lb)   LMP 06/19/2012   SpO2 98%   BMI 25.80 kg/m        Wt Readings from Last 4 Encounters:   03/13/23 69.1 kg (152 lb 4.8 oz)   03/07/23 69.3 kg (152 lb 12.8 oz)   03/02/23 70.3 kg (154 lb 14.4 oz)   04/15/22 70.3 kg (155 lb)       General: Well appearing woma in no distress, up and go quick  Eyes: EOMI. Sclerae and conjunctivae are clear.   HENT: No thyromegaly or mass.    Lymphatic: No cervical or supraclavicular lymphadenopathy.  Cardiovascular: RRR, with normal S1+S2 and no murmurs.   Skin no hirsutism no  acne no skin tags  Extremities: No peripheral edema.   Neurologic: No tremor with hands outstretched. 2+ patellar reflexes.       Labs and Studies:   Lab Results   Component Value Date     05/04/2023    CO2 23 05/04/2023    CHLORIDE 102 05/04/2023    CR 0.62 05/04/2023    TRIG 57 04/23/2014    HDL 67 04/23/2014    HGB 13.2 05/04/2023    TSH <0.01 (L) 05/03/2023    TSH <0.01 (L) 03/06/2023    TSH <0.02 (L) 12/14/2012    TSH 0.03 (L) 08/10/2012    TSH 0.20 (L) 06/29/2012     Recent Labs   Lab Test 05/03/23  0845 03/06/23  1942   T4 1.68 2.32*   FT3 5.5*  --    TSH <0.01* <0.01*       .  Lab Results   Component Value Date     05/04/2023    CHLORIDE 102 05/04/2023    CO2 23 05/04/2023     (H) 05/04/2023    CR 0.62 05/04/2023    CR 0.60 03/07/2023    CR 0.72 03/06/2023    CR 0.66 08/10/2012    CR 0.76 06/27/2012    PRITI 10.1 05/04/2023    MAG 1.7 05/04/2023    ALBUMIN 3.7 03/07/2023    ALKPHOS 62 03/07/2023     04/23/2014    HDL 67 04/23/2014    TRIG 57 04/23/2014    TSH <0.01 (L) 05/03/2023    TSH <0.01 (L) 03/06/2023    TSH <0.02 (L) 12/14/2012     Lab Results   Component Value Date    A1C 6.4 (H) 03/06/2023       Lab Results   Component Value Date    HGB 13.2 05/04/2023

## 2023-06-21 ENCOUNTER — OFFICE VISIT (OUTPATIENT)
Dept: ENDOCRINOLOGY | Facility: CLINIC | Age: 63
End: 2023-06-21
Payer: COMMERCIAL

## 2023-06-21 ENCOUNTER — PRE VISIT (OUTPATIENT)
Dept: ENDOCRINOLOGY | Facility: CLINIC | Age: 63
End: 2023-06-21

## 2023-06-21 VITALS
DIASTOLIC BLOOD PRESSURE: 81 MMHG | SYSTOLIC BLOOD PRESSURE: 130 MMHG | HEART RATE: 74 BPM | BODY MASS INDEX: 25.8 KG/M2 | WEIGHT: 156 LBS | OXYGEN SATURATION: 98 %

## 2023-06-21 DIAGNOSIS — R79.89 LOW TSH LEVEL: ICD-10-CM

## 2023-06-21 PROCEDURE — 99204 OFFICE O/P NEW MOD 45 MIN: CPT | Performed by: INTERNAL MEDICINE

## 2023-06-21 RX ORDER — LEVOTHYROXINE SODIUM 137 UG/1
137 TABLET ORAL DAILY
Qty: 90 TABLET | Refills: 3 | Status: ON HOLD | OUTPATIENT
Start: 2023-06-21 | End: 2023-11-22

## 2023-06-21 RX ORDER — DEXAMETHASONE 1 MG
TABLET ORAL
Qty: 1 TABLET | Refills: 0 | Status: ON HOLD | OUTPATIENT
Start: 2023-06-21 | End: 2023-11-22

## 2023-06-21 RX ORDER — ESTRADIOL 1 MG/1
1 TABLET ORAL DAILY
Status: ON HOLD | COMMUNITY
End: 2023-11-22

## 2023-06-21 RX ORDER — PROGESTERONE 100 MG/1
100 CAPSULE ORAL DAILY
Qty: 90 CAPSULE | Refills: 3 | Status: SHIPPED | OUTPATIENT
Start: 2023-06-21

## 2023-06-21 NOTE — PATIENT INSTRUCTIONS
Take Dexamethasone 1 mg the night prior to lab draw    Switch NP thyroid to Levothyroxine      To schedule a lab appointment,  Either use [employer].  Or call as below    Lab    General [phone]   [facility] [phone]   [place] [phone]   [facility]  [phone]   [facility] [phone]   [place] [phone]   [facility]) [phone]   [facility] Walk[facility] Only   [place] [phone]   [place] [phone]   [place] [phone]   [place] [phone]

## 2023-06-21 NOTE — LETTER
6/21/2023       RE: Andree Garcia  [address]  [facility] [address]     Dear Colleague,    Thank you for referring your patient, Andree Garcia, to the [facility] ENDOCRINOLOGY [facility] [place] at [facility]. Please see a copy of my visit note below.        Endocrine Clinic Consult      Andree Garcia is a 62 year old female who is being evaluated via a billable video visit.        Consulting provider: Juve Ulloa MD  [address] [place] [address]    Reason for consultation: hot flashes, hypothyroidism      Assessment:  Middle aged woman with history of hypothyroidism with comorbidity of depression with recent episode of atrial fibrillation.  It appears the patient had been overtreated for hypothyroidism which is associated with increased risk of atrial fibrillation.  Presents with main concern of diaphoresis for which patient has risk of being overtreated with thyroid hormones and also A1c in the high prediabetic range.  Patient denies any spells, chest tightness and headaches as well as anxiety therefore diaphoresis reflects pheochromocytoma.  The sweating also does not appear to be postmenopausal hot flashes as they are consistently throughout the day different from shortness she experienced before and patient had been on 1 mg of estrogen which typically treats hot flashes well.    Considering abdominal weight gain and fullness in her face as well as increase in glucose reasonable to rule out cortisol excess although patient lacks true moon facies, acne and violaceous striae.  Patient's presentation is most consistent with iatrogenic thyrotoxicosis with diaphoresis and atrial fibrillation and postmenopausal the central weight gain during time of significant social stress.      Menopause  Patient is about 7 years postmenopausal, estrogen was started for night sweats interfering with sleep which is very reasonable, agree with dose  adjustment of estradiol to 0.5 mg, need to combine with progesterone for uterine protection    Plan:   Switch NP thyroid 90 mg to levothyroxine 137 mcg daily  Evaluate cortisol excess with 1 mg dexamethasone suppression  Rule out medullary thyroid tumor and growth hormone excess with calcitonin check for further evaluation of sweating    Yue Marley MD  Endocrinology and Diabetes  Telephone contact:  [facility] [place] [phone]  [facility] [place] [phone]                HPI:   Andree Garcia is a 62 year old woman coming in regards to history of hypothyroidism on thyroid hormone replacement and concern of postmenopausal changes.  Patient is mostly concerned about feeling sweaty and clammy almost all day long even when in air conditioning for the last 2 to 3 months.  It is different from hot flashes that she experienced postmenopausal which were not total mild and intermittent also severe enough to wake her up from sleep.  Patient does feel clammy head to toe.  No associated symptoms particularly no headaches chest tightness anxiety.    Patient had developed palpitations and found to be in atrial fibrillation, had ablation which has been successful.  No further palpitations since    Patient notes that she had recent weight gain over the last 5 to 7 years of 10 to 20 pounds.  Of note weight in our system was noted at 153 pounds in 2014 which is almost the same as today.    Hypothyroid since 2018  Due to Hashimoto's thyroiditis    Thyroid medications: NP Thyroid 120 mg decreased to 90 mg daily 6 weeks ago  Takes thyroid medication: In the morning with water does not eat for 30 minutes    Menopause age 55 years -         Symptoms of hypo- and hyperthyroidism:  Weight change 15lbs wieght gain last 15 months; heat or cold intolerance heat; abnormal bowel movements no; hair loss no, change in skin pattern skin rahses; anxiety lots of stress family, depression n; heart  racing resolved; tremors no  Symptoms in the anterior neck: dysphagia no; dysphonia no; pain no;    Family history of thyroid disease in her mother was diagnosed in her 80s    Current Problem List:   Patient Active Problem List   Diagnosis    CARDIOVASCULAR SCREENING; LDL GOAL LESS THAN 160    GERD (gastroesophageal reflux disease)    Abdominal pain, left lower quadrant    Hypomagnesemia    Elevated troponin    Atrial fibrillation with RVR (H)    Elevated brain natriuretic peptide (BNP) level    Low TSH level    High anion gap metabolic acidosis    Nausea vomiting and diarrhea    Leukocytosis, unspecified type    Lyme disease             Past Medical and Past Surgical History:  Past Medical History:   Diagnosis Date    Anxiety     developed with menopause    Chest pain     non cardiac    Costochondritis     Hyperlipidemia     Lyme disease        Past Surgical History:   Procedure Laterality Date    COLONOSCOPY  9/11/2012    Procedure: COLONOSCOPY;  COLONOSCOPY,  ESOPHAGOSCOPY, GASTROSCOPY, DUODENOSCOPY;  Surgeon: Dominic Gray MD;  Location:  GI    DILATION AND CURETTAGE      EP ABLATION FOCAL AFIB N/A 5/4/2023    Procedure: EP Ablation Focal AFIB;  Surgeon: Geni Frye MD;  Location: [facility] CARDIAC CATH LAB    ESOPHAGOSCOPY, GASTROSCOPY, DUODENOSCOPY (EGD), COMBINED  9/11/2012    Procedure: COMBINED ESOPHAGOSCOPY, GASTROSCOPY, DUODENOSCOPY (EGD);;  Surgeon: Dominic Gray MD;  Location:  GI    EXCISE CONDYLOMA RECTUM      uterine polypectomy         Medications:   Current Outpatient Medications   Medication Sig Dispense Refill    aspirin (ASA) 81 MG EC tablet Take 1 tablet (81 mg) by mouth daily 90 tablet 3    Cholecalciferol (VITAMIN D3) Take 1,000 Units by mouth daily      diltiazem (CARDIZEM) 30 MG tablet Take 1 tablet (30 mg) by mouth 4 times daily 120 tablet 0    estradiol (ESTRACE VAGINAL) 0.1 MG/GM vaginal cream Apply small amount to the vaginal opening and urethra M, W, F @ h.s. 42.5 g 3     estradiol (ESTRACE VAGINAL) 0.1 MG/GM vaginal cream Apply small amount to the vaginal opening and urethra M, W, F @ h.s. 42.5 g 3    flecainide (TAMBOCOR) 50 MG tablet Take 1 tablet (50 mg) by mouth 2 times daily 120 tablet 1    Multiple Vitamin (MULTIVITAMIN OR) Take 1 tablet by mouth daily      NP THYROID 90 MG tablet Take 1.5 tablets (135 mg) by mouth daily at 2 pm      Omega-3 Fatty Acids (OMEGA III EPA+DHA PO) Take 1 capsule by mouth daily      ondansetron (ZOFRAN) 4 MG tablet Take 1 tablet (4 mg) by mouth every 6 hours as needed for nausea or vomiting 12 tablet 0    progesterone (PROMETRIUM) 100 MG capsule Take 100 mg by mouth daily      rivaroxaban ANTICOAGULANT (XARELTO) 20 MG TABS tablet Take 1 tablet (20 mg) by mouth daily (with dinner) Please start 2 weeks prior to procedure and continue for 4 weeks afterwards. 60 tablet 0    VIIBRYD 40 MG TABS tablet TAKE 1 TABLET BY MOUTH EVERY DAY WITH FOOD         Allergies:   Allergies   Allergen Reactions    Sulfa Antibiotics      Hives       Social History     Tobacco Use    Smoking status: Never    Smokeless tobacco: Never   Vaping Use    Vaping status: Not on file   Substance Use Topics    Alcohol use: Yes     Comment: social - maybe 3 times week       Family History   Problem Relation Age of Onset    Cardiovascular Father     Cerebrovascular Disease Father     C.A.D. Father     Arthritis Brother 15        juvenile arthritis    Breast Cancer Sister 50        breast cancer - in complete remission    Thyroid Disease Mother     Other - See Comments Sister         brain tumor survivor       Physical Examination:  /81 (BP Location: Right arm, Patient Position: Sitting, Cuff Size: Adult Regular)   Pulse 74   Wt 70.8 kg (156 lb)   LMP 06/19/2012   SpO2 98%   BMI 25.80 kg/m        Wt Readings from Last 4 Encounters:   03/13/23 69.1 kg (152 lb 4.8 oz)   03/07/23 69.3 kg (152 lb 12.8 oz)   03/02/23 70.3 kg (154 lb 14.4 oz)   04/15/22 70.3 kg (155 lb)        General: Well appearing woma in no distress, up and go quick  Eyes: EOMI. Sclerae and conjunctivae are clear.   HENT: No thyromegaly or mass.    Lymphatic: No cervical or supraclavicular lymphadenopathy.  Cardiovascular: RRR, with normal S1+S2 and no murmurs.   Skin no hirsutism no acne no skin tags  Extremities: No peripheral edema.   Neurologic: No tremor with hands outstretched. 2+ patellar reflexes.       Labs and Studies:   Lab Results   Component Value Date     05/04/2023    CO2 23 05/04/2023    CHLORIDE 102 05/04/2023    CR 0.62 05/04/2023    TRIG 57 04/23/2014    HDL 67 04/23/2014    HGB 13.2 05/04/2023    TSH <0.01 (L) 05/03/2023    TSH <0.01 (L) 03/06/2023    TSH <0.02 (L) 12/14/2012    TSH 0.03 (L) 08/10/2012    TSH 0.20 (L) 06/29/2012     Recent Labs   Lab Test 05/03/23  0845 03/06/23  1942   T4 1.68 2.32*   FT3 5.5*  --    TSH <0.01* <0.01*       .  Lab Results   Component Value Date     05/04/2023    CHLORIDE 102 05/04/2023    CO2 23 05/04/2023     (H) 05/04/2023    CR 0.62 05/04/2023    CR 0.60 03/07/2023    CR 0.72 03/06/2023    CR 0.66 08/10/2012    CR 0.76 06/27/2012    PRITI 10.1 05/04/2023    MAG 1.7 05/04/2023    ALBUMIN 3.7 03/07/2023    ALKPHOS 62 03/07/2023     04/23/2014    HDL 67 04/23/2014    TRIG 57 04/23/2014    TSH <0.01 (L) 05/03/2023    TSH <0.01 (L) 03/06/2023    TSH <0.02 (L) 12/14/2012     Lab Results   Component Value Date    A1C 6.4 (H) 03/06/2023       Lab Results   Component Value Date    HGB 13.2 05/04/2023               Yue Marley MD

## 2023-06-23 ENCOUNTER — LAB (OUTPATIENT)
Dept: LAB | Facility: CLINIC | Age: 63
End: 2023-06-23
Payer: COMMERCIAL

## 2023-06-23 DIAGNOSIS — R79.89 LOW TSH LEVEL: ICD-10-CM

## 2023-06-23 LAB
CORTIS SERPL-MCNC: 6.8 UG/DL
FASTING STATUS PATIENT QL REPORTED: ABNORMAL
GLUCOSE SERPL-MCNC: 115 MG/DL (ref 70–99)
T4 FREE SERPL-MCNC: 1.4 NG/DL (ref 0.9–1.7)
TSH SERPL DL<=0.005 MIU/L-ACNC: <0.01 UIU/ML (ref 0.3–4.2)

## 2023-06-23 PROCEDURE — 84439 ASSAY OF FREE THYROXINE: CPT

## 2023-06-23 PROCEDURE — 82533 TOTAL CORTISOL: CPT

## 2023-06-23 PROCEDURE — 84443 ASSAY THYROID STIM HORMONE: CPT

## 2023-06-23 PROCEDURE — 82308 ASSAY OF CALCITONIN: CPT | Mod: 90

## 2023-06-23 PROCEDURE — 82947 ASSAY GLUCOSE BLOOD QUANT: CPT

## 2023-06-23 PROCEDURE — 36415 COLL VENOUS BLD VENIPUNCTURE: CPT

## 2023-06-23 PROCEDURE — 99000 SPECIMEN HANDLING OFFICE-LAB: CPT

## 2023-06-24 LAB — CALCIT SERPL-MCNC: <2 PG/ML

## 2023-06-26 ENCOUNTER — TELEPHONE (OUTPATIENT)
Dept: ENDOCRINOLOGY | Facility: CLINIC | Age: 63
End: 2023-06-26
Payer: COMMERCIAL

## 2023-07-10 ENCOUNTER — ALLIED HEALTH/NURSE VISIT (OUTPATIENT)
Dept: CARDIOLOGY | Facility: CLINIC | Age: 63
End: 2023-07-10
Attending: INTERNAL MEDICINE
Payer: COMMERCIAL

## 2023-07-10 DIAGNOSIS — R79.89 LOW TSH LEVEL: Primary | ICD-10-CM

## 2023-07-10 DIAGNOSIS — I48.91 ATRIAL FIBRILLATION WITH RVR (H): ICD-10-CM

## 2023-08-11 ENCOUNTER — OFFICE VISIT (OUTPATIENT)
Dept: CARDIOLOGY | Facility: CLINIC | Age: 63
End: 2023-08-11
Attending: INTERNAL MEDICINE
Payer: COMMERCIAL

## 2023-08-11 VITALS
OXYGEN SATURATION: 98 % | WEIGHT: 160.7 LBS | BODY MASS INDEX: 25.83 KG/M2 | SYSTOLIC BLOOD PRESSURE: 127 MMHG | DIASTOLIC BLOOD PRESSURE: 81 MMHG | HEART RATE: 86 BPM | HEIGHT: 66 IN

## 2023-08-11 DIAGNOSIS — I48.91 ATRIAL FIBRILLATION WITH RVR (H): Primary | ICD-10-CM

## 2023-08-11 PROCEDURE — 99214 OFFICE O/P EST MOD 30 MIN: CPT | Performed by: INTERNAL MEDICINE

## 2023-08-11 PROCEDURE — 99211 OFF/OP EST MAY X REQ PHY/QHP: CPT | Mod: 25 | Performed by: INTERNAL MEDICINE

## 2023-08-11 PROCEDURE — 93005 ELECTROCARDIOGRAM TRACING: CPT

## 2023-08-11 PROCEDURE — 93010 ELECTROCARDIOGRAM REPORT: CPT | Performed by: INTERNAL MEDICINE

## 2023-08-11 RX ORDER — FLECAINIDE ACETATE 50 MG/1
50 TABLET ORAL 2 TIMES DAILY
COMMUNITY
End: 2023-08-11

## 2023-08-11 RX ORDER — ASPIRIN 81 MG/1
81 TABLET ORAL DAILY
COMMUNITY

## 2023-08-11 ASSESSMENT — PAIN SCALES - GENERAL: PAINLEVEL: NO PAIN (0)

## 2023-08-11 NOTE — PATIENT INSTRUCTIONS
Plan:    Stop flecainide  Follow-up in 6 months      Your Care Team:  EP Cardiology   Telephone Number     Laurie Schroeder RN [phone]    After business hours: [phone], ask for cardiologist on-call   Non-procedure scheduling:    Mira TRIPLETT   [phone]   Procedure scheduling:    Marcela Jamison   [phone]   Device Clinic (Pacemakers, ICDs, Loop Recorders)    During business hours: [phone]  After business hours:   [phone]- select option 4 and ask for job code [id number].       Cardiovascular Clinic:   [address] [place] [address]. [place] [address]      As always, thank you for trusting us with your health care needs!

## 2023-08-11 NOTE — PROGRESS NOTES
HPI:   Andree Garcia is a 61 yo Female with a PMH of HLD, hypothyroidism, recurrent UTIs, costochondritis, Lyme disease and PAF.  She was referred for cardiology evaluation.    She is now Diltiazem 30 mg QID but cannot tolerate it due to dizziness.  She had very symptomatic PAF related to food poisoning and dehydration in 1/2023.    She denied any chest pain and SOB and complained of dizziness and palpitation.  She is having 6-7 PAF episodes lasting for an hour every day.  She stated that mental stress might be a trigger.    She underwent catheter ablation of AF including PVI and Roof line on 5/4/2023.  Since then she has been doing well and is now seen for a follow up.    PAST MEDICAL HISTORY:  Past Medical History:   Diagnosis Date    Anxiety     developed with menopause    Chest pain     non cardiac    Costochondritis     Hyperlipidemia     Lyme disease        CURRENT MEDICATIONS:  Current Outpatient Medications   Medication Sig Dispense Refill    aspirin 81 MG EC tablet Take 81 mg by mouth daily      Cholecalciferol (VITAMIN D3) Take 1,000 Units by mouth daily      flecainide (TAMBOCOR) 50 MG tablet Take 50 mg by mouth 2 times daily      levothyroxine (SYNTHROID/LEVOTHROID) 137 MCG tablet Take 1 tablet (137 mcg) by mouth daily 90 tablet 3    Multiple Vitamin (MULTIVITAMIN OR) Take 1 tablet by mouth daily      Omega-3 Fatty Acids (OMEGA III EPA+DHA PO) Take 1 capsule by mouth daily      progesterone (PROMETRIUM) 100 MG capsule Take 1 capsule (100 mg) by mouth daily 90 capsule 3    VIIBRYD 40 MG TABS tablet TAKE 1 TABLET BY MOUTH EVERY DAY WITH FOOD      dexamethasone (DECADRON) 1 MG tablet Sig at bedtime the evening prior to lab draw (Patient not taking: Reported on 8/11/2023) 1 tablet 0    estradiol (ESTRACE) 1 MG tablet Take 1 mg by mouth daily Half a pill a day = 0.5 mg (Patient not taking: Reported on 8/11/2023)         PAST SURGICAL HISTORY:  Past Surgical History:   Procedure Laterality Date    COLONOSCOPY   "9/11/2012    Procedure: COLONOSCOPY;  COLONOSCOPY,  ESOPHAGOSCOPY, GASTROSCOPY, DUODENOSCOPY;  Surgeon: Dominic Gray MD;  Location:  GI    DILATION AND CURETTAGE      EP ABLATION FOCAL AFIB N/A 5/4/2023    Procedure: EP Ablation Focal AFIB;  Surgeon: Geni Frye MD;  Location:  HEART CARDIAC CATH LAB    ESOPHAGOSCOPY, GASTROSCOPY, DUODENOSCOPY (EGD), COMBINED  9/11/2012    Procedure: COMBINED ESOPHAGOSCOPY, GASTROSCOPY, DUODENOSCOPY (EGD);;  Surgeon: Dominic Gray MD;  Location:  GI    EXCISE CONDYLOMA RECTUM      uterine polypectomy         ALLERGIES:     Allergies   Allergen Reactions    Sulfa Antibiotics      Hives       FAMILY HISTORY:  + Premature coronary artery disease  - Atrial fibrillation  - Sudden cardiac death     SOCIAL HISTORY:  Social History     Tobacco Use    Smoking status: Never    Smokeless tobacco: Never   Substance Use Topics    Alcohol use: Yes     Comment: social - maybe 3 times week    Drug use: No       ROS:   Constitutional: No fever, chills, or sweats. Weight stable.   ENT: No visual disturbance, ear ache, epistaxis, sore throat.   Cardiovascular: As per HPI.   Respiratory: No cough, hemoptysis.    GI: No nausea, vomiting, hematemesis, melena, or hematochezia.   : No hematuria.   Integument: Negative.   Psychiatric: Negative.   Hematologic:  Easy bruising, no easy bleeding.  Neuro: Negative.   Endocrinology: No significant heat or cold intolerance   Musculoskeletal: No myalgia.    Exam:  /81 (BP Location: Right arm, Patient Position: Chair, Cuff Size: Adult Regular)   Pulse 86   Ht 1.683 m (5' 6.26\")   Wt 72.9 kg (160 lb 11.2 oz)   LMP 06/19/2012   SpO2 98%   BMI 25.73 kg/m    GENERAL APPEARANCE: healthy, alert and no distress  HEENT: no icterus, no xanthelasmas, normal pupil size and reaction, normal palate, mucosa moist, no central cyanosis  NECK: no adenopathy, no asymmetry, masses, or scars, thyroid normal to palpation and no bruits, JVP not " elevated  RESPIRATORY: lungs clear to auscultation - no rales, rhonchi or wheezes, no use of accessory muscles, no retractions, respirations are unlabored, normal respiratory rate  CARDIOVASCULAR: regular rhythm, normal S1 with physiologic split S2, no S3 or S4 and no murmur, click or rub, precordium quiet with normal PMI.  ABDOMEN: soft, non tender, without hepatosplenomegaly, no masses palpable, bowel sounds normal, aorta not enlarged by palpation, no abdominal bruits  EXTREMITIES: peripheral pulses normal, no edema, no bruits  NEURO: alert and oriented to person/place/time, normal speech, gait and affect  VASC: Radial, femoral, dorsalis pedis and posterior tibialis pulses are normal in volumes and symmetric bilaterally. No bruits are heard.  SKIN: no ecchymoses, no rashes    Labs:  CBC RESULTS:   Lab Results   Component Value Date    WBC 5.6 05/04/2023    WBC 5.0 08/10/2012    RBC 4.57 05/04/2023    RBC 3.89 08/10/2012    HGB 13.2 05/04/2023    HGB 11.9 08/10/2012    HCT 41.5 05/04/2023    HCT 35.9 08/10/2012    MCV 91 05/04/2023    MCV 92 08/10/2012    MCH 28.9 05/04/2023    MCH 30.6 08/10/2012    MCHC 31.8 05/04/2023    MCHC 33.1 08/10/2012    RDW 13.4 05/04/2023    RDW 13.4 08/10/2012     05/04/2023     08/10/2012       BMP RESULTS:  Lab Results   Component Value Date     05/04/2023     08/10/2012    POTASSIUM 4.5 05/04/2023    POTASSIUM 3.9 08/10/2012    CHLORIDE 102 05/04/2023    CHLORIDE 103 08/10/2012    CO2 23 05/04/2023    CO2 27 08/10/2012    ANIONGAP 13 05/04/2023    ANIONGAP 8 08/10/2012     (H) 06/23/2023    GLC 94 08/10/2012    BUN 16.4 05/04/2023    BUN 13 08/10/2012    CR 0.62 05/04/2023    CR 0.66 08/10/2012    GFRESTIMATED >90 05/04/2023    GFRESTIMATED >90 08/10/2012    GFRESTBLACK >90 08/10/2012    PRITI 10.1 05/04/2023    PRITI 9.5 08/10/2012        INR RESULTS:  Lab Results   Component Value Date    INR 1.47 (H) 05/04/2023    INR 1.00 03/06/2023        Procedures:  TTE on 3/7/2023: Reviewed.  Interpretation Summary  Patient in sinus rhythm during exam.  Global and regional left ventricular function is normal with an EF of 55-60%.  Global right ventricular function is normal. The right ventricle is normal  size.  No significant valvular abnormalities.  The estimated PA systolic pressure is 43 mmHg.  IVC diameter and respiratory changes fall into an intermediate range  suggesting an RA pressure of 8 mmHg.  There is no prior study for direct comparison.    ECG on 3/6/2023: Reviewed.    ECG on 3/13/2023: Reviewed.    Zio patch in 7-8/2023: Reviewed.          ECG on 8/11/2023: Review      Assessment and Plan:   # HLD  # Hypothyroidism  # Recurrent UTIs  # Costochondritis  # Lyme disease  # Intolerance of Diltiazem.  # PAF  Symptomatic. Refractory to Diltiazem.   -> s/p catheter ablation of AF including PVI and Roof line on 5/4/2023.  Since then she has been doing well.  I advised her to discontinue Flecainide and see how it works.  I will see her back in 6 months.    I spent a total of 30 min today to review the records, see the patient, and complete the documents.     CC  Patient Care Team:  Sigrid Porter NP as PCP - General (Nurse Practitioner - Adult Health)  Sofia Jacobs PA-C as Assigned OBGYN Provider  Maria Eugenia West MD as MD (Endocrinology, Diabetes, and Metabolism)  Geni Frye MD as Assigned Heart and Vascular Provider  Yue Marley MD as MD (Endocrinology, Diabetes, and Metabolism)  Yue Marley MD as Assigned Endocrinology Provider  AB CALDERON

## 2023-08-11 NOTE — LETTER
8/11/2023      RE: Andree Garcia  [address]  [facility] [address]       Dear Colleague,    Thank you for the opportunity to participate in the care of your patient, Andree Garcia, at the [facility]. Please see a copy of my visit note below.    HPI:   Andree Garcia is a 63 yo Female with a PMH of HLD, hypothyroidism, recurrent UTIs, costochondritis, Lyme disease and PAF.  She was referred for cardiology evaluation.    She is now Diltiazem 30 mg QID but cannot tolerate it due to dizziness.  She had very symptomatic PAF related to food poisoning and dehydration in 1/2023.    She denied any chest pain and SOB and complained of dizziness and palpitation.  She is having 6-7 PAF episodes lasting for an hour every day.  She stated that mental stress might be a trigger.    She underwent catheter ablation of AF including PVI and Roof line on 5/4/2023.  Since then she has been doing well and is now seen for a follow up.    PAST MEDICAL HISTORY:  Past Medical History:   Diagnosis Date    Anxiety     developed with menopause    Chest pain     non cardiac    Costochondritis     Hyperlipidemia     Lyme disease        CURRENT MEDICATIONS:  Current Outpatient Medications   Medication Sig Dispense Refill    aspirin 81 MG EC tablet Take 81 mg by mouth daily      Cholecalciferol (VITAMIN D3) Take 1,000 Units by mouth daily      flecainide (TAMBOCOR) 50 MG tablet Take 50 mg by mouth 2 times daily      levothyroxine (SYNTHROID/LEVOTHROID) 137 MCG tablet Take 1 tablet (137 mcg) by mouth daily 90 tablet 3    Multiple Vitamin (MULTIVITAMIN OR) Take 1 tablet by mouth daily      Omega-3 Fatty Acids (OMEGA III EPA+DHA PO) Take 1 capsule by mouth daily      progesterone (PROMETRIUM) 100 MG capsule Take 1 capsule (100 mg) by mouth daily 90 capsule 3    VIIBRYD 40 MG TABS tablet TAKE 1 TABLET BY MOUTH EVERY DAY WITH FOOD      dexamethasone  "(DECADRON) 1 MG tablet Sig at bedtime the evening prior to lab draw (Patient not taking: Reported on 8/11/2023) 1 tablet 0    estradiol (ESTRACE) 1 MG tablet Take 1 mg by mouth daily Half a pill a day = 0.5 mg (Patient not taking: Reported on 8/11/2023)         PAST SURGICAL HISTORY:  Past Surgical History:   Procedure Laterality Date    COLONOSCOPY  9/11/2012    Procedure: COLONOSCOPY;  COLONOSCOPY,  ESOPHAGOSCOPY, GASTROSCOPY, DUODENOSCOPY;  Surgeon: Dominic Gray MD;  Location:  GI    DILATION AND CURETTAGE      EP ABLATION FOCAL AFIB N/A 5/4/2023    Procedure: EP Ablation Focal AFIB;  Surgeon: Geni Frey MD;  Location:  HEART CARDIAC CATH LAB    ESOPHAGOSCOPY, GASTROSCOPY, DUODENOSCOPY (EGD), COMBINED  9/11/2012    Procedure: COMBINED ESOPHAGOSCOPY, GASTROSCOPY, DUODENOSCOPY (EGD);;  Surgeon: Dominic Gray MD;  Location:  GI    EXCISE CONDYLOMA RECTUM      uterine polypectomy         ALLERGIES:     Allergies   Allergen Reactions    Sulfa Antibiotics      Hives       FAMILY HISTORY:  + Premature coronary artery disease  - Atrial fibrillation  - Sudden cardiac death     SOCIAL HISTORY:  Social History     Tobacco Use    Smoking status: Never    Smokeless tobacco: Never   Substance Use Topics    Alcohol use: Yes     Comment: social - maybe 3 times week    Drug use: No       ROS:   Constitutional: No fever, chills, or sweats. Weight stable.   ENT: No visual disturbance, ear ache, epistaxis, sore throat.   Cardiovascular: As per HPI.   Respiratory: No cough, hemoptysis.    GI: No nausea, vomiting, hematemesis, melena, or hematochezia.   : No hematuria.   Integument: Negative.   Psychiatric: Negative.   Hematologic:  Easy bruising, no easy bleeding.  Neuro: Negative.   Endocrinology: No significant heat or cold intolerance   Musculoskeletal: No myalgia.    Exam:  /81 (BP Location: Right arm, Patient Position: Chair, Cuff Size: Adult Regular)   Pulse 86   Ht 1.683 m (5' 6.26\")   Wt 72.9 kg " (160 lb 11.2 oz)   LMP 06/19/2012   SpO2 98%   BMI 25.73 kg/m    GENERAL APPEARANCE: healthy, alert and no distress  HEENT: no icterus, no xanthelasmas, normal pupil size and reaction, normal palate, mucosa moist, no central cyanosis  NECK: no adenopathy, no asymmetry, masses, or scars, thyroid normal to palpation and no bruits, JVP not elevated  RESPIRATORY: lungs clear to auscultation - no rales, rhonchi or wheezes, no use of accessory muscles, no retractions, respirations are unlabored, normal respiratory rate  CARDIOVASCULAR: regular rhythm, normal S1 with physiologic split S2, no S3 or S4 and no murmur, click or rub, precordium quiet with normal PMI.  ABDOMEN: soft, non tender, without hepatosplenomegaly, no masses palpable, bowel sounds normal, aorta not enlarged by palpation, no abdominal bruits  EXTREMITIES: peripheral pulses normal, no edema, no bruits  NEURO: alert and oriented to person/place/time, normal speech, gait and affect  VASC: Radial, femoral, dorsalis pedis and posterior tibialis pulses are normal in volumes and symmetric bilaterally. No bruits are heard.  SKIN: no ecchymoses, no rashes    Labs:  CBC RESULTS:   Lab Results   Component Value Date    WBC 5.6 05/04/2023    WBC 5.0 08/10/2012    RBC 4.57 05/04/2023    RBC 3.89 08/10/2012    HGB 13.2 05/04/2023    HGB 11.9 08/10/2012    HCT 41.5 05/04/2023    HCT 35.9 08/10/2012    MCV 91 05/04/2023    MCV 92 08/10/2012    MCH 28.9 05/04/2023    MCH 30.6 08/10/2012    MCHC 31.8 05/04/2023    MCHC 33.1 08/10/2012    RDW 13.4 05/04/2023    RDW 13.4 08/10/2012     05/04/2023     08/10/2012       BMP RESULTS:  Lab Results   Component Value Date     05/04/2023     08/10/2012    POTASSIUM 4.5 05/04/2023    POTASSIUM 3.9 08/10/2012    CHLORIDE 102 05/04/2023    CHLORIDE 103 08/10/2012    CO2 23 05/04/2023    CO2 27 08/10/2012    ANIONGAP 13 05/04/2023    ANIONGAP 8 08/10/2012     (H) 06/23/2023    GLC 94 08/10/2012    BUN  16.4 05/04/2023    BUN 13 08/10/2012    CR 0.62 05/04/2023    CR 0.66 08/10/2012    GFRESTIMATED >90 05/04/2023    GFRESTIMATED >90 08/10/2012    GFRESTBLACK >90 08/10/2012    PRITI 10.1 05/04/2023    PRITI 9.5 08/10/2012        INR RESULTS:  Lab Results   Component Value Date    INR 1.47 (H) 05/04/2023    INR 1.00 03/06/2023       Procedures:  TTE on 3/7/2023: Reviewed.  Interpretation Summary  Patient in sinus rhythm during exam.  Global and regional left ventricular function is normal with an EF of 55-60%.  Global right ventricular function is normal. The right ventricle is normal  size.  No significant valvular abnormalities.  The estimated PA systolic pressure is 43 mmHg.  IVC diameter and respiratory changes fall into an intermediate range  suggesting an RA pressure of 8 mmHg.  There is no prior study for direct comparison.    ECG on 3/6/2023: Reviewed.    ECG on 3/13/2023: Reviewed.    Zio patch in 7-8/2023: Reviewed.          ECG on 8/11/2023: Review      Assessment and Plan:   # HLD  # Hypothyroidism  # Recurrent UTIs  # Costochondritis  # Lyme disease  # Intolerance of Diltiazem.  # PAF  Symptomatic. Refractory to Diltiazem.   -> s/p catheter ablation of AF including PVI and Roof line on 5/4/2023.  Since then she has been doing well.  I advised her to discontinue Flecainide and see how it works.  I will see her back in 6 months.    I spent a total of 30 min today to review the records, see the patient, and complete the documents.     CC  Patient Care Team:  Sigrid Porter NP as PCP - General (Nurse Practitioner - Adult Health)  Soifa Jacobs PA-C as Assigned OBGYN Provider    Please do not hesitate to contact me if you have any questions/concerns.     Sincerely,     Geni Frye MD

## 2023-08-11 NOTE — NURSING NOTE
Chief Complaint   Patient presents with    Follow Up     Dr. Frye: Return post AF ablation       Vitals were taken, medications reconciled.    Maira Fuentes, Facilitator   10:39 AM  Vitals were performed. Medications reconciled. EKG was performed.   Maira Fuentes - Visit Facilitator

## 2023-08-14 LAB
ATRIAL RATE - MUSE: 86 BPM
DIASTOLIC BLOOD PRESSURE - MUSE: NORMAL MMHG
INTERPRETATION ECG - MUSE: NORMAL
P AXIS - MUSE: 134 DEGREES
PR INTERVAL - MUSE: 156 MS
QRS DURATION - MUSE: 70 MS
QT - MUSE: 362 MS
QTC - MUSE: 433 MS
R AXIS - MUSE: 164 DEGREES
SYSTOLIC BLOOD PRESSURE - MUSE: NORMAL MMHG
T AXIS - MUSE: 163 DEGREES
VENTRICULAR RATE- MUSE: 86 BPM

## 2023-09-15 ENCOUNTER — TRANSCRIBE ORDERS (OUTPATIENT)
Dept: OTHER | Age: 63
End: 2023-09-15

## 2023-09-15 DIAGNOSIS — Z12.11 SCREENING FOR MALIGNANT NEOPLASM OF COLON: Primary | ICD-10-CM

## 2023-09-28 ENCOUNTER — TELEPHONE (OUTPATIENT)
Dept: GASTROENTEROLOGY | Facility: CLINIC | Age: 63
End: 2023-09-28
Payer: COMMERCIAL

## 2023-09-28 NOTE — TELEPHONE ENCOUNTER
"Endoscopy Scheduling Screen    Have you had a positive Covid test in the last 14 days?  No    Are you active on MyChart?   Yes    What insurance is in the chart?  Other:  [employer]    Ordering/Referring Provider: RASHI JOHNSON   (If ordering provider performs procedure, schedule with ordering provider unless otherwise instructed. )    BMI: Estimated body mass index is 25.73 kg/m  as calculated from the following:    Height as of 8/11/23: 1.683 m (5' 6.26\").    Weight as of 8/11/23: 72.9 kg (160 lb 11.2 oz).     Sedation Ordered  moderate sedation.   If patient BMI > 50 do not schedule in [facility].    If patient BMI > 45 do not schedule at [facility].    Are you taking methadone or Suboxone?  No    Are you taking any prescription medications for pain 3 or more times per week?   No    Do you have a history of malignant hyperthermia or adverse reaction to anesthesia?  No    (Females) Are you currently pregnant?   No     Have you been diagnosed or told you have pulmonary hypertension?   No    Do you have an LVAD?  No    Have you been told you have moderate to severe sleep apnea?  No    Have you been told you have COPD, asthma, or any other lung disease?  No    Do you have any heart conditions?  No  heart flutter    Have you ever had an organ transplant?   No    Have you ever had or are you awaiting a heart or lung transplant?   No    Have you had a stroke or transient ischemic attack (TIA aka \"mini stroke\" in the last 6 months?   No    Have you been diagnosed with or been told you have cirrhosis of the liver?   No    Are you currently on dialysis?   No    Do you need assistance transferring?   No    BMI: Estimated body mass index is 25.73 kg/m  as calculated from the following:    Height as of 8/11/23: 1.683 m (5' 6.26\").    Weight as of 8/11/23: 72.9 kg (160 lb 11.2 oz).     Is patients BMI > 40 and scheduling location [facility]?  No    Do you take an injectable medication for weight loss or diabetes (excluding " insulin)?  No    Do you take the medication Naltrexone?  No    Do you take blood thinners?  No       Prep   Are you currently on dialysis or do you have chronic kidney disease?  No    Do you have a diagnosis of diabetes?  No    Do you have a diagnosis of cystic fibrosis (CF)?  No    On a regular basis do you go 3 -5 days between bowel movements?  No    BMI > 40?  No    Preferred Pharmacy:      [id number] - [address] AT [address] [address]  Phone: [phone] Fax: [phone]      Final Scheduling Details   Colonoscopy prep sent?  Standard MiraLAX    Procedure scheduled  Colonoscopy    Surgeon:  Eloisa     Date of procedure:  11/22/23     Pre-OP / PAC:   No - Not required for this site.    Location  [facility] - Patient preference.    Sedation   Moderate Sedation - Per order.      Patient Reminders:   You will receive a call from a Nurse to review instructions and health history.  This assessment must be completed prior to your procedure.  Failure to complete the Nurse assessment may result in the procedure being cancelled.      On the day of your procedure, please designate an adult(s) who can drive you home stay with you for the next 24 hours. The medicines used in the exam will make you sleepy. You will not be able to drive.      You cannot take public transportation, ride share services, or non-medical taxi service without a responsible caregiver.  Medical transport services are allowed with the requirement that a responsible caregiver will receive you at your destination.  We require that drivers and caregivers are confirmed prior to your procedure.

## 2023-11-04 ENCOUNTER — HEALTH MAINTENANCE LETTER (OUTPATIENT)
Age: 63
End: 2023-11-04

## 2023-11-22 ENCOUNTER — HOSPITAL ENCOUNTER (OUTPATIENT)
Facility: CLINIC | Age: 63
Discharge: HOME | End: 2023-11-22
Attending: COLON & RECTAL SURGERY | Admitting: COLON & RECTAL SURGERY
Payer: COMMERCIAL

## 2023-11-22 VITALS
RESPIRATION RATE: 18 BRPM | OXYGEN SATURATION: 92 % | DIASTOLIC BLOOD PRESSURE: 82 MMHG | WEIGHT: 160 LBS | HEART RATE: 82 BPM | BODY MASS INDEX: 25.71 KG/M2 | HEIGHT: 66 IN | SYSTOLIC BLOOD PRESSURE: 117 MMHG

## 2023-11-22 LAB — COLONOSCOPY: NORMAL

## 2023-11-22 PROCEDURE — G0500 MOD SEDAT ENDO SERVICE >5YRS: HCPCS | Performed by: COLON & RECTAL SURGERY

## 2023-11-22 PROCEDURE — 88305 TISSUE EXAM BY PATHOLOGIST: CPT | Mod: 26 | Performed by: PATHOLOGY

## 2023-11-22 PROCEDURE — 250N000011 HC RX IP 250 OP 636: Performed by: COLON & RECTAL SURGERY

## 2023-11-22 PROCEDURE — 88305 TISSUE EXAM BY PATHOLOGIST: CPT | Mod: TC | Performed by: COLON & RECTAL SURGERY

## 2023-11-22 PROCEDURE — 45385 COLONOSCOPY W/LESION REMOVAL: CPT | Performed by: COLON & RECTAL SURGERY

## 2023-11-22 RX ORDER — FENTANYL CITRATE 50 UG/ML
INJECTION, SOLUTION INTRAMUSCULAR; INTRAVENOUS PRN
Status: DISCONTINUED | OUTPATIENT
Start: 2023-11-22 | End: 2023-11-22 | Stop reason: HOSPADM

## 2023-11-22 RX ORDER — LIDOCAINE 40 MG/G
CREAM TOPICAL
Status: DISCONTINUED | OUTPATIENT
Start: 2023-11-22 | End: 2023-11-22 | Stop reason: HOSPADM

## 2023-11-22 RX ORDER — ONDANSETRON 2 MG/ML
4 INJECTION INTRAMUSCULAR; INTRAVENOUS
Status: DISCONTINUED | OUTPATIENT
Start: 2023-11-22 | End: 2023-11-22 | Stop reason: HOSPADM

## 2023-11-22 ASSESSMENT — ACTIVITIES OF DAILY LIVING (ADL): ADLS_ACUITY_SCORE: 35

## 2023-11-22 NOTE — H&P
Colon & Rectal Surgery History and Physical  Pre-Endoscopy Procedure Note    History of Present Illness   I have been asked by Sigrid Poretr to evaluate this 63 year old female for colorectal cancer screening.  She currently denies any abdominal pain, weight loss, bleeding per rectum, or recent change in bowel habits.    Past Medical History  Diagnosis Date    Anxiety     developed with menopause    Chest pain     non cardiac    Costochondritis     Hyperlipidemia     Lyme disease        Past Surgical History  Procedure Laterality Date    DILATION AND CURETTAGE      ABLATION FOCAL AFIB N/A 5/4/2023    Procedure: EP Ablation Focal AFIB;  Surgeon: Geni Frye MD;  Location: [facility] CARDIAC CATH LAB    ESOPHAGOSCOPY, GASTROSCOPY, DUODENOSCOPY (EGD), COMBINED  9/11/2012    Procedure: COMBINED ESOPHAGOSCOPY, GASTROSCOPY, DUODENOSCOPY (EGD);;  Surgeon: Dominic Gray MD;  Location:  GI    EXCISE CONDYLOMA RECTUM      Uterine polypectomy          Medications  Medications Prior to Admission   Medication Sig    aspirin 81 MG EC tablet Take 81 mg by mouth daily    Cholecalciferol (VITAMIN D3) Take 1,000 Units by mouth daily    Multiple Vitamin (MULTIVITAMIN OR) Take 1 tablet by mouth daily    Omega-3 Fatty Acids (OMEGA III EPA+DHA PO) Take 1 capsule by mouth daily    progesterone (PROMETRIUM) 100 MG capsule Take 1 capsule (100 mg) by mouth daily    VIIBRYD 40 MG TABS tablet TAKE 1 TABLET BY MOUTH EVERY DAY WITH FOOD       Allergies  Allergen Reactions    Sulfa Antibiotics      Hives        Family History   Family history includes Arthritis (age of onset: 15) in her brother; Breast Cancer (age of onset: 50) in her sister; C.A.D. in her father; Cardiovascular Disorder in her father; Cerebrovascular Disease in her father; Thyroid Disease in her mother.     Social History   She reports that she has never smoked. She has never used smokeless tobacco. She reports current alcohol use. She reports that she does not use  "drugs.    Review of Systems   Constitutional:  No fever, weight change or fatigue.    Eyes:     No dry eyes or vision changes.   Ears/Nose/Throat/Neck:  No oral ulcers, sore throat or voice change.    Cardiovascular:   No palpitations, syncope, angina or edema.   Respiratory:    No chest pain, excessive sleepiness, shortness of breath or hemoptysis.    Gastrointestinal:   No abdominal pain, nausea, vomiting, diarrhea or heartburn.    Genitourinary:   No dysuria, hematuria, urinary retention or urinary frequency.   Musculoskeletal:  No joint swelling or arthralgias.    Dermatologic:  No skin rash or other skin changes.   Neurologic:    No focal weakness or numbness. No neuropathy.   Psychiatric:    No depression, anxiety, suicidal ideation, or paranoid ideation.   Endocrine:   No cold or heat intolerance, polydipsia, hirsutism, change in libido, or flushing.   Hematology/Lymphatic:  No bleeding or lymphadenopathy.    Allergy/Immunology:  No rhinitis or hives.     Physical Exam   Vitals:  Blood pressure 121/74, pulse 76, resp. rate 10, height 1.676 m (5' 6\"), weight 72.6 kg (160 lb), last menstrual period 06/19/2012, SpO2 99%.    General:  Alert and oriented to person, place and time   Airway: Normal oropharyngeal airway and neck mobility   Lungs:  Clear bilaterally   Heart:  Regular rate and rhythm   Abdomen: Soft, NT, ND, no masses   Extremities: Warm, good capillary refill    ASA Grade: II (mild systemic disease)    Impression: Cleared for use of conscious sedation for colorectal cancer screening    Plan: Proceed with colonoscopy     Emmy Amin MD  [facility]  [phone]  "

## 2023-11-27 LAB
PATH REPORT.COMMENTS IMP SPEC: NORMAL
PATH REPORT.FINAL DX SPEC: NORMAL
PATH REPORT.GROSS SPEC: NORMAL
PATH REPORT.MICROSCOPIC SPEC OTHER STN: NORMAL
PATH REPORT.RELEVANT HX SPEC: NORMAL
PHOTO IMAGE: NORMAL

## 2023-12-29 ENCOUNTER — TELEPHONE (OUTPATIENT)
Dept: CARDIOLOGY | Facility: CLINIC | Age: 63
End: 2023-12-29
Payer: COMMERCIAL

## 2024-01-02 ENCOUNTER — TELEPHONE (OUTPATIENT)
Dept: CARDIOLOGY | Facility: CLINIC | Age: 64
End: 2024-01-02
Payer: COMMERCIAL

## 2024-03-21 PROBLEM — R19.7 NAUSEA VOMITING AND DIARRHEA: Status: RESOLVED | Noted: 2023-03-06 | Resolved: 2024-03-21

## 2024-03-21 PROBLEM — R11.2 NAUSEA VOMITING AND DIARRHEA: Status: RESOLVED | Noted: 2023-03-06 | Resolved: 2024-03-21

## 2024-03-29 ENCOUNTER — OFFICE VISIT (OUTPATIENT)
Dept: CARDIOLOGY | Facility: CLINIC | Age: 64
End: 2024-03-29
Attending: NURSE PRACTITIONER
Payer: COMMERCIAL

## 2024-03-29 VITALS
SYSTOLIC BLOOD PRESSURE: 129 MMHG | BODY MASS INDEX: 26.99 KG/M2 | DIASTOLIC BLOOD PRESSURE: 83 MMHG | OXYGEN SATURATION: 96 % | HEART RATE: 82 BPM | WEIGHT: 167.2 LBS

## 2024-03-29 DIAGNOSIS — I48.91 ATRIAL FIBRILLATION WITH RVR (H): ICD-10-CM

## 2024-03-29 PROCEDURE — 93005 ELECTROCARDIOGRAM TRACING: CPT

## 2024-03-29 PROCEDURE — 99213 OFFICE O/P EST LOW 20 MIN: CPT | Performed by: NURSE PRACTITIONER

## 2024-03-29 ASSESSMENT — PAIN SCALES - GENERAL: PAINLEVEL: NO PAIN (0)

## 2024-03-29 NOTE — PROGRESS NOTES
ELECTROPHYSIOLOGY CLINIC VISIT    Assessment/Recommendations   Assessment/Plan:  Ms. Andree Garcia is a 63 year old female who comes in today for EP follow-up of paroxysmal atrial fibrillation.    Ms. Flores has PMH of HLD, hypothyroidism, recurrent UTIs, costochondritis, Lyme disease and PAP s/p catheter ablation 2023    # Paroxysmal Atrial Fibrillation   We discussed in detail with the patient management/treatment options for Rodriguez includin. Stroke Prophylaxis:  CHADSVASC=1  1, corresponding to a 1.3% annual stroke / systemic emolism event rate. indicating need for long term oral anticoagulation.    2. Rate Control: maintaining NSR  3. Rhythm Control: Discussed that should she have recurrence of AF, could consider cardioversion, antiarrhythmics (previously on flecainide) and/or redo-ablation are options for rhythm control.  4. Risk Factor Management: Statin, BP control, and STEFANIA evaluation.        Follow up with EP in 2 years, sooner if needed.        History of Present Illness/Subjective    Ms. Andree Garcia is a 63 year old female who comes in today for EP follow-up of paroxysmal atrial fibrillation.    Ms. While has PMH of HLD, hypothyroidism, recurrent UTIs, costochondritis, Lyme disease and PAP s/p catheter ablation 2023.    She was initially evaluated by Dr. Frye in 3/2023 for symptomatic pAF. First dx in 2023, thought to be related to food poisoning and dehydration. She had been placed on diltiazem 30 mg QID but was unable to tolerate this due to dizziness. Underwent catheter ablation of AF including PVI and Roof line on 2023. At follow up with Dr. Frye in 2023, Ms. Garcia was feeling well without known recurrence of AF. Flecainide was discontinued at this visit.     Today, Ms. Garcia reports feeling well. She denies chest discomfort, palpitations, abdominal fullness/bloating or peripheral edema, shortness of breath, paroxysmal nocturnal dyspnea, orthopnea, lightheadedness, dizziness,  pre-syncope, or syncope.   Weaned herself of synthroid ~ 7 months ago. Reports that she had TF testing done ~ 3 months ago and levels were all normal.     Presenting 12 lead ECG shows NSR Vent Rate 76 bpm,  ms, QRS 88 ms, QTc 447 ms.     Current cardiac medications include: ASA 81 mg daily.     I have reviewed and updated the patient's Past Medical History, Social History, Family History and Medication List.     Cardiographics (Personally Reviewed) :   3/7/2023 Echo:   Interpretation Summary  Patient in sinus rhythm during exam.  Global and regional left ventricular function is normal with an EF of 55-60%.  Global right ventricular function is normal. The right ventricle is normal  size.  No significant valvular abnormalities.  The estimated PA systolic pressure is 43 mmHg.  IVC diameter and respiratory changes fall into an intermediate range  suggesting an RA pressure of 8 mmHg.  There is no prior study for direct comparison.    3/21/2023 NM Stress Test:     The nuclear stress test is negative for inducible myocardial ischemia or infarction.    LVEDv 99ml. LVESv 13ml. LV EF 87%.    There is no prior study for comparison.     Physical Examination   LMP 06/19/2012   Wt Readings from Last 3 Encounters:   11/22/23 72.6 kg (160 lb)   08/11/23 72.9 kg (160 lb 11.2 oz)   06/21/23 70.8 kg (156 lb)     General Appearance:   Alert, well-appearing and in no acute distress.   HEENT: Atraumatic, normocephalic. PERRL.  MMM.   Chest/Lungs:   Respirations unlabored.  Lungs are clear to auscultation.   Cardiovascular:   Regular rate and rhythm.  S1/S2. No murmur.    Abdomen:  Soft, nontender, nondistended.   Extremities: No cyanosis or clubbing. No edema.    Musculoskeletal: Moves all extremities.  Gait wnl.   Skin: Warm, dry, intact.    Neurologic: Mood and affect are appropriate.  Alert and oriented to person, place, time, and situation.          Medications  Allergies   Current Outpatient Medications   Medication Sig  Dispense Refill    aspirin 81 MG EC tablet Take 81 mg by mouth daily      Cholecalciferol (VITAMIN D3) Take 1,000 Units by mouth daily      Multiple Vitamin (MULTIVITAMIN OR) Take 1 tablet by mouth daily      Omega-3 Fatty Acids (OMEGA III EPA+DHA PO) Take 1 capsule by mouth daily      progesterone (PROMETRIUM) 100 MG capsule Take 1 capsule (100 mg) by mouth daily 90 capsule 3    VIIBRYD 40 MG TABS tablet TAKE 1 TABLET BY MOUTH EVERY DAY WITH FOOD      Allergies   Allergen Reactions    Sulfa Antibiotics      Hives         Lab Results (Personally Reviewed)    Chemistry/lipid CBC Cardiac Enzymes/BNP/TSH/INR   Lab Results   Component Value Date    BUN 16.4 05/04/2023     05/04/2023    CO2 23 05/04/2023     Creatinine   Date Value Ref Range Status   05/04/2023 0.62 0.51 - 0.95 mg/dL Final   08/10/2012 0.66 0.52 - 1.04 mg/dL Final       Lab Results   Component Value Date    CHOL 230 (A) 04/23/2014    HDL 67 04/23/2014     04/23/2014      Lab Results   Component Value Date    WBC 5.6 05/04/2023    HGB 13.2 05/04/2023    HCT 41.5 05/04/2023    MCV 91 05/04/2023     05/04/2023    Lab Results   Component Value Date    TSH <0.01 (L) 06/23/2023    INR 1.47 (H) 05/04/2023        The patient states understanding and is agreeable with the plan.   Karoline Rogers DNP, NP-C  Cardiac Electrophysiology   03/28/24      Total time spent on patient visit, reviewing notes, imaging, labs, orders, and completing necessary documentation: 30 minutes.

## 2024-03-29 NOTE — NURSING NOTE
Chief Complaint   Patient presents with    Follow Up     March 29, 2024 - Karoline Rogers NP: Return EP d/t Afib       Vitals were taken, medications reconciled, and EKG was performed.    RAKEL Byrnes  10:11 AM

## 2024-03-29 NOTE — LETTER
3/29/2024      RE: Andree Garcia  [address]  [facility] [address]       Dear Colleague,    Thank you for the opportunity to participate in the care of your patient, Andree Garcia, at the [facility] [place] at [facility]. Please see a copy of my visit note below.        ELECTROPHYSIOLOGY CLINIC VISIT    Assessment/Recommendations   Assessment/Plan:  Ms. Andree Garcia is a 63 year old female who comes in today for EP follow-up of paroxysmal atrial fibrillation.    MsParamjit While has PMH of HLD, hypothyroidism, recurrent UTIs, costochondritis, Lyme disease and PAP s/p catheter ablation 2023    # Paroxysmal Atrial Fibrillation   We discussed in detail with the patient management/treatment options for Rodriguez includin. Stroke Prophylaxis:  CHADSVASC=1  1, corresponding to a 1.3% annual stroke / systemic emolism event rate. indicating need for long term oral anticoagulation.    2. Rate Control: maintaining NSR  3. Rhythm Control: Discussed that should she have recurrence of AF, could consider cardioversion, antiarrhythmics (previously on flecainide) and/or redo-ablation are options for rhythm control.  4. Risk Factor Management: Statin, BP control, and STEFANIA evaluation.        Follow up with EP in 2 years, sooner if needed.        History of Present Illness/Subjective    Ms. Andree Garcia is a 63 year old female who comes in today for EP follow-up of paroxysmal atrial fibrillation.    Ms. While has PMH of HLD, hypothyroidism, recurrent UTIs, costochondritis, Lyme disease and PAP s/p catheter ablation 2023.    She was initially evaluated by Dr. Frye in 3/2023 for symptomatic pAF. First dx in 2023, thought to be related to food poisoning and dehydration. She had been placed on diltiazem 30 mg QID but was unable to tolerate this due to dizziness. Underwent catheter ablation of AF including PVI and Roof line on 2023. At follow up with   Melva in 8/2023, Ms. Garcia was feeling well without known recurrence of AF. Flecainide was discontinued at this visit.     Today, Ms. Garcia reports feeling well. She denies chest discomfort, palpitations, abdominal fullness/bloating or peripheral edema, shortness of breath, paroxysmal nocturnal dyspnea, orthopnea, lightheadedness, dizziness, pre-syncope, or syncope.   Weaned herself of synthroid ~ 7 months ago. Reports that she had TF testing done ~ 3 months ago and levels were all normal.     Presenting 12 lead ECG shows NSR Vent Rate 76 bpm,  ms, QRS 88 ms, QTc 447 ms.     Current cardiac medications include: ASA 81 mg daily.     I have reviewed and updated the patient's Past Medical History, Social History, Family History and Medication List.     Cardiographics (Personally Reviewed) :   3/7/2023 Echo:   Interpretation Summary  Patient in sinus rhythm during exam.  Global and regional left ventricular function is normal with an EF of 55-60%.  Global right ventricular function is normal. The right ventricle is normal  size.  No significant valvular abnormalities.  The estimated PA systolic pressure is 43 mmHg.  IVC diameter and respiratory changes fall into an intermediate range  suggesting an RA pressure of 8 mmHg.  There is no prior study for direct comparison.    3/21/2023 NM Stress Test:     The nuclear stress test is negative for inducible myocardial ischemia or infarction.    LVEDv 99ml. LVESv 13ml. LV EF 87%.    There is no prior study for comparison.     Physical Examination   LMP 06/19/2012   Wt Readings from Last 3 Encounters:   11/22/23 72.6 kg (160 lb)   08/11/23 72.9 kg (160 lb 11.2 oz)   06/21/23 70.8 kg (156 lb)     General Appearance:   Alert, well-appearing and in no acute distress.   HEENT: Atraumatic, normocephalic. PERRL.  MMM.   Chest/Lungs:   Respirations unlabored.  Lungs are clear to auscultation.   Cardiovascular:   Regular rate and rhythm.  S1/S2. No murmur.    Abdomen:  Soft,  nontender, nondistended.   Extremities: No cyanosis or clubbing. No edema.    Musculoskeletal: Moves all extremities.  Gait wnl.   Skin: Warm, dry, intact.    Neurologic: Mood and affect are appropriate.  Alert and oriented to person, place, time, and situation.          Medications  Allergies   Current Outpatient Medications   Medication Sig Dispense Refill    aspirin 81 MG EC tablet Take 81 mg by mouth daily      Cholecalciferol (VITAMIN D3) Take 1,000 Units by mouth daily      Multiple Vitamin (MULTIVITAMIN OR) Take 1 tablet by mouth daily      Omega-3 Fatty Acids (OMEGA III EPA+DHA PO) Take 1 capsule by mouth daily      progesterone (PROMETRIUM) 100 MG capsule Take 1 capsule (100 mg) by mouth daily 90 capsule 3    VIIBRYD 40 MG TABS tablet TAKE 1 TABLET BY MOUTH EVERY DAY WITH FOOD      Allergies   Allergen Reactions    Sulfa Antibiotics      Hives         Lab Results (Personally Reviewed)    Chemistry/lipid CBC Cardiac Enzymes/BNP/TSH/INR   Lab Results   Component Value Date    BUN 16.4 05/04/2023     05/04/2023    CO2 23 05/04/2023     Creatinine   Date Value Ref Range Status   05/04/2023 0.62 0.51 - 0.95 mg/dL Final   08/10/2012 0.66 0.52 - 1.04 mg/dL Final       Lab Results   Component Value Date    CHOL 230 (A) 04/23/2014    HDL 67 04/23/2014     04/23/2014      Lab Results   Component Value Date    WBC 5.6 05/04/2023    HGB 13.2 05/04/2023    HCT 41.5 05/04/2023    MCV 91 05/04/2023     05/04/2023    Lab Results   Component Value Date    TSH <0.01 (L) 06/23/2023    INR 1.47 (H) 05/04/2023        The patient states understanding and is agreeable with the plan.   Karoline Rogers DNP, NP-C  Cardiac Electrophysiology   03/28/24      Total time spent on patient visit, reviewing notes, imaging, labs, orders, and completing necessary documentation: 30 minutes.              Please do not hesitate to contact me if you have any questions/concerns.     Sincerely,     TANYA Rutledge CNP

## 2024-04-01 LAB
ATRIAL RATE - MUSE: 76 BPM
DIASTOLIC BLOOD PRESSURE - MUSE: NORMAL MMHG
INTERPRETATION ECG - MUSE: NORMAL
P AXIS - MUSE: 39 DEGREES
PR INTERVAL - MUSE: 158 MS
QRS DURATION - MUSE: 88 MS
QT - MUSE: 398 MS
QTC - MUSE: 447 MS
R AXIS - MUSE: 6 DEGREES
SYSTOLIC BLOOD PRESSURE - MUSE: NORMAL MMHG
T AXIS - MUSE: 23 DEGREES
VENTRICULAR RATE- MUSE: 76 BPM

## 2024-06-01 ENCOUNTER — HEALTH MAINTENANCE LETTER (OUTPATIENT)
Age: 64
End: 2024-06-01

## 2024-10-04 ENCOUNTER — OFFICE VISIT (OUTPATIENT)
Dept: CARDIOLOGY | Facility: CLINIC | Age: 64
End: 2024-10-04
Attending: NURSE PRACTITIONER
Payer: COMMERCIAL

## 2024-10-04 VITALS
BODY MASS INDEX: 27.13 KG/M2 | DIASTOLIC BLOOD PRESSURE: 83 MMHG | WEIGHT: 168.1 LBS | OXYGEN SATURATION: 95 % | HEART RATE: 80 BPM | SYSTOLIC BLOOD PRESSURE: 131 MMHG

## 2024-10-04 DIAGNOSIS — I48.0 PAF (PAROXYSMAL ATRIAL FIBRILLATION) (H): Primary | ICD-10-CM

## 2024-10-04 PROCEDURE — 93005 ELECTROCARDIOGRAM TRACING: CPT

## 2024-10-04 PROCEDURE — 99213 OFFICE O/P EST LOW 20 MIN: CPT | Performed by: NURSE PRACTITIONER

## 2024-10-04 PROCEDURE — 93010 ELECTROCARDIOGRAM REPORT: CPT | Performed by: INTERNAL MEDICINE

## 2024-10-04 ASSESSMENT — PAIN SCALES - GENERAL: PAINLEVEL: NO PAIN (0)

## 2024-10-04 NOTE — LETTER
10/4/2024      RE: Andree Garcia  [address]  [facility] [address]       Dear Colleague,    Thank you for the opportunity to participate in the care of your patient, Andree Garcia, at the [facility] [place] at [facility]. Please see a copy of my visit note below.        ELECTROPHYSIOLOGY CLINIC VISIT    Assessment/Recommendations   Assessment/Plan:    Ms. Garcia is a 62 year old female who has a medical history significant for PAF(CHADSVASC 1) s/p PVI with posterior box 5/4/23, HLD, hypothyroidism, Lyme disease, costochondritis, recurrent UTIs and anxiety.     Paroxysmal Atrial Fibrillation:   I had a long discussion with the patient about AF, including the natural history of the disease, risk of embolic events, role of antithrombotic therapy, role of rate control therapy, the role of antiarrhythmic medications, and the role of an ablation. We discussed the AFFIRM trial.  1. Stroke Prophylaxis: CHADSVASC +gender, non contributory if only risk factor 0, corresponding to a 0.2-0.5% annual stroke / systemic embolism event rate. Indicating no need for chronic oral anticoagulation. She takes ASA 81 mg daily.   2. Rate Control: Not currently requiring AVN agents.   3. Rhythm Control: Cardioversion, Antiarrhythmics and/or ablation are options for rhythm control. She had been on Flecainide with continued AF. She underwent PVI with posterior box on 5/4/23. Flecainide was stopped 8/2023. She started having recurrent palpitations described as irregular heart rate since 8/2024. These are not overly bothersome to her at this time. She would like to continue with conservative measures at this time. We discussed if increasing frequency and/or severity, we could consider AAT or repeat ablation if needed. We would likely start by re monitoring to assess burden and nature of recurrences.   4. Risk Factor Management: Statin, BP control, and STEFANIA evaluation as  indicated.       Follow up in 6 months or sooner if need arises.        History of Present Illness/Subjective    Ms. Andree Garcia is a 64 year old female who comes in today for EP follow-up of PAF.    Ms. Garcia is a 62 year old female who has a medical history significant for PAF(CHADSVASC 1) s/p PVI with posterior box 5/4/23, HLD, hypothyroidism, Lyme disease, costochondritis, recurrent UTIs and anxiety.      Over the past couple she has been having palpitations several times per day. These palpitations come on suddenly for 30 minutes to an hour at a time. She will sometimes wake up in the morning and have these palpitations. She was found to have AF. She saw Dr. Frye in clinic who discussed management options and she elected to pursue ablation and had PVI with LA posterior box on 5/4/23.  At follow up with Dr. Frye in 8/2023, Ms. Garcia was feeling well without known recurrence of AF. Flecainide was discontinued at this visit. At follow up with EP in 3/2024, she was doing well without recurrences.    She presents today for follow up. She reports increased palpitations over last 1-2 months. These are occurring a couple times a week lasting 30 minutes or so. They are not overly bothersome to her. She feels irregular pulse rate as her symptom. She has some fatigue. She denies chest discomfort, palpitations, abdominal fullness/bloating or peripheral edema, shortness of breath, paroxysmal nocturnal dyspnea, orthopnea, lightheadedness, dizziness, pre-syncope, or syncope. An echo from 3/2023 showed normal cardiac structure and function. Presenting 12 lead ECG shows NSR Vent Rate 76 bpm,  ms, QRS 68 ms, QTc 436 ms. Current cardiac medications include: ASA.         I have reviewed and updated the patient's Past Medical History, Social History, Family History and Medication List.     Cardiographics (Personally Reviewed) :   3/7/23 Echo:   Interpretation Summary  Patient in sinus rhythm during exam.  Global and  regional left ventricular function is normal with an EF of 55-60%.  Global right ventricular function is normal. The right ventricle is normal  size.  No significant valvular abnormalities.  The estimated PA systolic pressure is 43 mmHg.  IVC diameter and respiratory changes fall into an intermediate range  suggesting an RA pressure of 8 mmHg.  There is no prior study for direct comparison.    3/21/23 NM Stress Test:      The nuclear stress test is negative for inducible myocardial ischemia or infarction.     LVEDv 99ml. LVESv 13ml. LV EF 87%.     There is no prior study for comparison.       Physical Examination   /83 (BP Location: Left arm, Patient Position: Chair, Cuff Size: Adult Regular)   Pulse 80   Wt 76.2 kg (168 lb 1.6 oz)   LMP 06/19/2012   SpO2 95%   BMI 27.13 kg/m    Wt Readings from Last 3 Encounters:   03/29/24 75.8 kg (167 lb 3.2 oz)   11/22/23 72.6 kg (160 lb)   08/11/23 72.9 kg (160 lb 11.2 oz)     General Appearance:   Alert, well-appearing and in no acute distress.   HEENT: Atraumatic, normocephalic. PERRL.  MMM.   Chest/Lungs:   Respirations unlabored.  Lungs are clear to auscultation.   Cardiovascular:   Regular rate and rhythm.  S1/S2. No murmur.    Abdomen:  Soft, nontender, nondistended.   Extremities: No cyanosis or clubbing. No edema.    Musculoskeletal: Moves all extremities.  Gait normal.   Skin: Warm, dry, intact.    Neurologic: Mood and affect are appropriate.  Alert and oriented to person, place, time, and situation.          Medications  Allergies   Current Outpatient Medications   Medication Sig Dispense Refill     aspirin 81 MG EC tablet Take 81 mg by mouth daily (Patient not taking: Reported on 3/29/2024)       Cholecalciferol (VITAMIN D3) Take 1,000 Units by mouth daily       Multiple Vitamin (MULTIVITAMIN OR) Take 1 tablet by mouth daily       Omega-3 Fatty Acids (OMEGA III EPA+DHA PO) Take 1 capsule by mouth daily       progesterone (PROMETRIUM) 100 MG capsule Take 1  capsule (100 mg) by mouth daily 90 capsule 3     VIIBRYD 40 MG TABS tablet TAKE 1 TABLET BY MOUTH EVERY DAY WITH FOOD      Allergies   Allergen Reactions     Sulfa Antibiotics      Hives         Lab Results (Personally Reviewed)    Chemistry/lipid CBC Cardiac Enzymes/BNP/TSH/INR   Lab Results   Component Value Date    BUN 16.4 05/04/2023     05/04/2023    CO2 23 05/04/2023     Creatinine   Date Value Ref Range Status   05/04/2023 0.62 0.51 - 0.95 mg/dL Final   08/10/2012 0.66 0.52 - 1.04 mg/dL Final       Lab Results   Component Value Date    CHOL 230 (A) 04/23/2014    HDL 67 04/23/2014     04/23/2014      Lab Results   Component Value Date    WBC 5.6 05/04/2023    HGB 13.2 05/04/2023    HCT 41.5 05/04/2023    MCV 91 05/04/2023     05/04/2023    Lab Results   Component Value Date    TSH <0.01 (L) 06/23/2023    INR 1.47 (H) 05/04/2023        The patient states understanding and is agreeable with the plan.   TANYA Topete CNP  Electrophysiology Consult Service  Securely message with [employer]   Text page via [employer] Paging/Directory                    Please do not hesitate to contact me if you have any questions/concerns.     Sincerely,     TANYA Marley CNP

## 2024-10-04 NOTE — NURSING NOTE
Chief Complaint   Patient presents with    Follow Up     Increased palpitations over last 1-2 months. Hx PVI 5/2023 by Dr Frye.       Vitals were taken, medications reconciled, and EKG was performed.    Andres Wilson EMT  11:35 AM

## 2024-10-04 NOTE — PROGRESS NOTES
ELECTROPHYSIOLOGY CLINIC VISIT    Assessment/Recommendations   Assessment/Plan:    Ms. Garcia is a 62 year old female who has a medical history significant for PAF(CHADSVASC 1) s/p PVI with posterior box 5/4/23, HLD, hypothyroidism, Lyme disease, costochondritis, recurrent UTIs and anxiety.     Paroxysmal Atrial Fibrillation:   I had a long discussion with the patient about AF, including the natural history of the disease, risk of embolic events, role of antithrombotic therapy, role of rate control therapy, the role of antiarrhythmic medications, and the role of an ablation. We discussed the AFFIRM trial.  1. Stroke Prophylaxis: CHADSVASC +gender, non contributory if only risk factor 0, corresponding to a 0.2-0.5% annual stroke / systemic embolism event rate. Indicating no need for chronic oral anticoagulation. She takes ASA 81 mg daily.   2. Rate Control: Not currently requiring AVN agents.   3. Rhythm Control: Cardioversion, Antiarrhythmics and/or ablation are options for rhythm control. She had been on Flecainide with continued AF. She underwent PVI with posterior box on 5/4/23. Flecainide was stopped 8/2023. She started having recurrent palpitations described as irregular heart rate since 8/2024. These are not overly bothersome to her at this time. She would like to continue with conservative measures at this time. We discussed if increasing frequency and/or severity, we could consider AAT or repeat ablation if needed. We would likely start by re monitoring to assess burden and nature of recurrences.   4. Risk Factor Management: Statin, BP control, and TSEFANIA evaluation as indicated.       Follow up in 6 months or sooner if need arises.        History of Present Illness/Subjective    Ms. Andree Garcia is a 64 year old female who comes in today for EP follow-up of PAF.    Ms. Garcia is a 62 year old female who has a medical history significant for PAF(CHADSVASC 1) s/p PVI with posterior box 5/4/23, HLD,  hypothyroidism, Lyme disease, costochondritis, recurrent UTIs and anxiety.      Over the past couple she has been having palpitations several times per day. These palpitations come on suddenly for 30 minutes to an hour at a time. She will sometimes wake up in the morning and have these palpitations. She was found to have AF. She saw Dr. Frye in clinic who discussed management options and she elected to pursue ablation and had PVI with LA posterior box on 5/4/23.  At follow up with Dr. Frye in 8/2023, Ms. Garcia was feeling well without known recurrence of AF. Flecainide was discontinued at this visit. At follow up with EP in 3/2024, she was doing well without recurrences.    She presents today for follow up. She reports increased palpitations over last 1-2 months. These are occurring a couple times a week lasting 30 minutes or so. They are not overly bothersome to her. She feels irregular pulse rate as her symptom. She has some fatigue. She denies chest discomfort, palpitations, abdominal fullness/bloating or peripheral edema, shortness of breath, paroxysmal nocturnal dyspnea, orthopnea, lightheadedness, dizziness, pre-syncope, or syncope. An echo from 3/2023 showed normal cardiac structure and function. Presenting 12 lead ECG shows NSR Vent Rate 76 bpm,  ms, QRS 68 ms, QTc 436 ms. Current cardiac medications include: ASA.         I have reviewed and updated the patient's Past Medical History, Social History, Family History and Medication List.     Cardiographics (Personally Reviewed) :   3/7/23 Echo:   Interpretation Summary  Patient in sinus rhythm during exam.  Global and regional left ventricular function is normal with an EF of 55-60%.  Global right ventricular function is normal. The right ventricle is normal  size.  No significant valvular abnormalities.  The estimated PA systolic pressure is 43 mmHg.  IVC diameter and respiratory changes fall into an intermediate range  suggesting an RA pressure of  8 mmHg.  There is no prior study for direct comparison.    3/21/23 NM Stress Test:     The nuclear stress test is negative for inducible myocardial ischemia or infarction.    LVEDv 99ml. LVESv 13ml. LV EF 87%.    There is no prior study for comparison.       Physical Examination   /83 (BP Location: Left arm, Patient Position: Chair, Cuff Size: Adult Regular)   Pulse 80   Wt 76.2 kg (168 lb 1.6 oz)   LMP 06/19/2012   SpO2 95%   BMI 27.13 kg/m    Wt Readings from Last 3 Encounters:   03/29/24 75.8 kg (167 lb 3.2 oz)   11/22/23 72.6 kg (160 lb)   08/11/23 72.9 kg (160 lb 11.2 oz)     General Appearance:   Alert, well-appearing and in no acute distress.   HEENT: Atraumatic, normocephalic. PERRL.  MMM.   Chest/Lungs:   Respirations unlabored.  Lungs are clear to auscultation.   Cardiovascular:   Regular rate and rhythm.  S1/S2. No murmur.    Abdomen:  Soft, nontender, nondistended.   Extremities: No cyanosis or clubbing. No edema.    Musculoskeletal: Moves all extremities.  Gait normal.   Skin: Warm, dry, intact.    Neurologic: Mood and affect are appropriate.  Alert and oriented to person, place, time, and situation.          Medications  Allergies   Current Outpatient Medications   Medication Sig Dispense Refill    aspirin 81 MG EC tablet Take 81 mg by mouth daily (Patient not taking: Reported on 3/29/2024)      Cholecalciferol (VITAMIN D3) Take 1,000 Units by mouth daily      Multiple Vitamin (MULTIVITAMIN OR) Take 1 tablet by mouth daily      Omega-3 Fatty Acids (OMEGA III EPA+DHA PO) Take 1 capsule by mouth daily      progesterone (PROMETRIUM) 100 MG capsule Take 1 capsule (100 mg) by mouth daily 90 capsule 3    VIIBRYD 40 MG TABS tablet TAKE 1 TABLET BY MOUTH EVERY DAY WITH FOOD      Allergies   Allergen Reactions    Sulfa Antibiotics      Hives         Lab Results (Personally Reviewed)    Chemistry/lipid CBC Cardiac Enzymes/BNP/TSH/INR   Lab Results   Component Value Date    BUN 16.4 05/04/2023    NA  138 05/04/2023    CO2 23 05/04/2023     Creatinine   Date Value Ref Range Status   05/04/2023 0.62 0.51 - 0.95 mg/dL Final   08/10/2012 0.66 0.52 - 1.04 mg/dL Final       Lab Results   Component Value Date    CHOL 230 (A) 04/23/2014    HDL 67 04/23/2014     04/23/2014      Lab Results   Component Value Date    WBC 5.6 05/04/2023    HGB 13.2 05/04/2023    HCT 41.5 05/04/2023    MCV 91 05/04/2023     05/04/2023    Lab Results   Component Value Date    TSH <0.01 (L) 06/23/2023    INR 1.47 (H) 05/04/2023        The patient states understanding and is agreeable with the plan.   TANYA Topete CNP  Electrophysiology Consult Service  Securely message with [employer]   Text page via [facility] Paging/Directory

## 2024-10-09 LAB
ATRIAL RATE - MUSE: 76 BPM
DIASTOLIC BLOOD PRESSURE - MUSE: NORMAL MMHG
INTERPRETATION ECG - MUSE: NORMAL
P AXIS - MUSE: 42 DEGREES
PR INTERVAL - MUSE: 164 MS
QRS DURATION - MUSE: 68 MS
QT - MUSE: 388 MS
QTC - MUSE: 436 MS
R AXIS - MUSE: -5 DEGREES
SYSTOLIC BLOOD PRESSURE - MUSE: NORMAL MMHG
T AXIS - MUSE: 15 DEGREES
VENTRICULAR RATE- MUSE: 76 BPM

## 2024-12-22 ENCOUNTER — HEALTH MAINTENANCE LETTER (OUTPATIENT)
Age: 64
End: 2024-12-22

## 2024-12-23 ENCOUNTER — TELEPHONE (OUTPATIENT)
Dept: CARDIOLOGY | Facility: CLINIC | Age: 64
End: 2024-12-23
Payer: COMMERCIAL

## 2025-04-08 ENCOUNTER — TELEPHONE (OUTPATIENT)
Dept: CARDIOLOGY | Facility: CLINIC | Age: 65
End: 2025-04-08
Payer: COMMERCIAL

## 2025-04-08 NOTE — TELEPHONE ENCOUNTER
Left Voicemail (1st Attempt) for the patient to call back and schedule the following:    Appointment type:  rtn ep   Provider: shaquille isaacs   Return date: 07/25/25  Specialty phone number: [phone] opt 1   Additional appointment(s) needed: n/a   Additonal Notes: n/a

## 2025-07-15 ENCOUNTER — OFFICE VISIT (OUTPATIENT)
Dept: CARDIOLOGY | Facility: CLINIC | Age: 65
End: 2025-07-15
Attending: INTERNAL MEDICINE
Payer: COMMERCIAL

## 2025-07-15 VITALS
BODY MASS INDEX: 26.6 KG/M2 | WEIGHT: 164.8 LBS | SYSTOLIC BLOOD PRESSURE: 137 MMHG | OXYGEN SATURATION: 95 % | DIASTOLIC BLOOD PRESSURE: 84 MMHG | HEART RATE: 75 BPM

## 2025-07-15 DIAGNOSIS — I48.0 PAF (PAROXYSMAL ATRIAL FIBRILLATION) (H): ICD-10-CM

## 2025-07-15 PROCEDURE — 3075F SYST BP GE 130 - 139MM HG: CPT | Performed by: INTERNAL MEDICINE

## 2025-07-15 PROCEDURE — 99213 OFFICE O/P EST LOW 20 MIN: CPT | Performed by: INTERNAL MEDICINE

## 2025-07-15 PROCEDURE — 1126F AMNT PAIN NOTED NONE PRSNT: CPT | Performed by: INTERNAL MEDICINE

## 2025-07-15 PROCEDURE — 93005 ELECTROCARDIOGRAM TRACING: CPT

## 2025-07-15 PROCEDURE — 3079F DIAST BP 80-89 MM HG: CPT | Performed by: INTERNAL MEDICINE

## 2025-07-15 RX ORDER — ESTRADIOL 0.04 MG/D
1 PATCH, EXTENDED RELEASE TRANSDERMAL
COMMUNITY
Start: 2025-07-02

## 2025-07-15 ASSESSMENT — PAIN SCALES - GENERAL: PAINLEVEL_OUTOF10: NO PAIN (0)

## 2025-07-15 NOTE — LETTER
7/15/2025      RE: Andree Garcia  [address]  [facility] [address]       Dear Colleague,    Thank you for the opportunity to participate in the care of your patient, Andree Garcia, at the [facility] [place] [facility]. Please see a copy of my visit note below.    HPI:   Andree is a pleasant 64-year-old woman with a history of atrial fibrillation who underwent redo frequency ablation in May 2023 and is followed in the cardiology clinic by Tere Oconnor.      Post ablation Andree was given anticoagulants and then later changed to aspirin which she maintains.  She is not on any rate control medications.    Since her last clinic visit patient indicates that she has been doing well and has no new cardiovascular symptoms.  She did indicate some sense of feeling her heart moving in her chest but this seems to be infrequent and not sustained.    Patient will continue to be treated with aspirin 81 mg daily and absent any change in her clinical picture we will plan to see her again in 1 years time.      PAST MEDICAL HISTORY:  Past Medical History:   Diagnosis Date     Anxiety     developed with menopause     Chest pain     non cardiac     Costochondritis      Hyperlipidemia      Lyme disease        CURRENT MEDICATIONS:  Current Outpatient Medications   Medication Sig Dispense Refill     aspirin 81 MG EC tablet Take 81 mg by mouth daily.       Cholecalciferol (VITAMIN D3) Take 1,000 Units by mouth daily       estradiol (VIVELLE-DOT) 0.0375 MG/24HR BIW patch Place 1 patch onto the skin twice a week       Multiple Vitamin (MULTIVITAMIN OR) Take 1 tablet by mouth daily       Omega-3 Fatty Acids (OMEGA III EPA+DHA PO) Take 1 capsule by mouth daily       progesterone (PROMETRIUM) 100 MG capsule Take 1 capsule (100 mg) by mouth daily 90 capsule 3     VIIBRYD 40 MG TABS tablet TAKE 1 TABLET BY MOUTH EVERY DAY WITH FOOD         PAST SURGICAL HISTORY:  Past  Surgical History:   Procedure Laterality Date     COLONOSCOPY  9/11/2012    Procedure: COLONOSCOPY;  COLONOSCOPY,  ESOPHAGOSCOPY, GASTROSCOPY, DUODENOSCOPY;  Surgeon: Dominic Gray MD;  Location:  GI     COLONOSCOPY N/A 11/22/2023    Procedure: Colonoscopy;  Surgeon: Emmy Amin MD;  Location:  GI     DILATION AND CURETTAGE       EP ABLATION FOCAL AFIB N/A 5/4/2023    Procedure: EP Ablation Focal AFIB;  Surgeon: Geni Frye MD;  Location:  HEART CARDIAC CATH LAB     ESOPHAGOSCOPY, GASTROSCOPY, DUODENOSCOPY (EGD), COMBINED  9/11/2012    Procedure: COMBINED ESOPHAGOSCOPY, GASTROSCOPY, DUODENOSCOPY (EGD);;  Surgeon: Dominic Gray MD;  Location:  GI     EXCISE CONDYLOMA RECTUM       uterine polypectomy         ALLERGIES:     Allergies   Allergen Reactions     Sulfa Antibiotics      Hives       FAMILY HISTORY:  Family History   Problem Relation Age of Onset     Cardiovascular Father      Cerebrovascular Disease Father      C.A.D. Father      Arthritis Brother 15        juvenile arthritis     Breast Cancer Sister 50        breast cancer - in complete remission     Thyroid Disease Mother      Other - See Comments Sister         brain tumor survivor       SOCIAL HISTORY:  Social History     Tobacco Use     Smoking status: Never     Smokeless tobacco: Never   Substance Use Topics     Alcohol use: Yes     Comment: social - maybe 3 times week     Drug use: No       ROS:   Constitutional: No fever, chills, or sweats. Weight stable.   ENT: No visual disturbance, ear ache, epistaxis, sore throat.   Cardiovascular: As per HPI.   Respiratory: No cough, hemoptysis.    GI: No nausea, vomiting,   : No hematuria.   Integument: Negative.   Psychiatric: Negative.   Hematologic:  Easy bruising, no easy bleeding.  Neuro: Negative.   Endocrinology: No significant heat or cold intolerance   Musculoskeletal: No myalgia.    Exam:  /84 (BP Location: Right arm, Patient Position: Chair, Cuff Size: Adult Regular)    Pulse 75   Wt 74.8 kg (164 lb 12.8 oz)   LMP 06/19/2012   SpO2 95%   BMI 26.60 kg/m    GENERAL APPEARANCE: healthy, alert and no distress  HEENT: no icterus,  no central cyanosis  NECK: no adenopathy, no asymmetry, masses, or scars, thyroid normal to palpation and no bruits, JVP not elevated  RESPIRATORY: l no rales, rhonchi or wheezes, no use of accessory muscles, no retractions, respirations are unlabored, normal respiratory rate  CARDIOVASCULAR: regular rhythm, normal S1 with physiologic split S2, no S3 or S4 and no murmur, click or rub, precordium quiet with normal PMI.  ABDOMEN: soft, non tender,   NEURO: alert and oriented to person/place/time, normal speech, gait and affect  SKIN: no ecchymoses, no rashes    Labs:  CBC RESULTS:   Lab Results   Component Value Date    WBC 5.6 05/04/2023    WBC 5.0 08/10/2012    RBC 4.57 05/04/2023    RBC 3.89 08/10/2012    HGB 13.2 05/04/2023    HGB 11.9 08/10/2012    HCT 41.5 05/04/2023    HCT 35.9 08/10/2012    MCV 91 05/04/2023    MCV 92 08/10/2012    MCH 28.9 05/04/2023    MCH 30.6 08/10/2012    MCHC 31.8 05/04/2023    MCHC 33.1 08/10/2012    RDW 13.4 05/04/2023    RDW 13.4 08/10/2012     05/04/2023     08/10/2012       BMP RESULTS:  Lab Results   Component Value Date     05/04/2023     08/10/2012    POTASSIUM 4.5 05/04/2023    POTASSIUM 3.9 08/10/2012    CHLORIDE 102 05/04/2023    CHLORIDE 103 08/10/2012    CO2 23 05/04/2023    CO2 27 08/10/2012    ANIONGAP 13 05/04/2023    ANIONGAP 8 08/10/2012     (H) 06/23/2023    GLC 94 08/10/2012    BUN 16.4 05/04/2023    BUN 13 08/10/2012    CR 0.62 05/04/2023    CR 0.66 08/10/2012    GFRESTIMATED >90 05/04/2023    GFRESTIMATED >90 08/10/2012    GFRESTBLACK >90 08/10/2012    PRITI 10.1 05/04/2023    PRITI 9.5 08/10/2012        INR RESULTS:  Lab Results   Component Value Date    INR 1.47 (H) 05/04/2023    INR 1.00 03/06/2023       Procedures:  PULMONARY FUNCTION TESTS:        No data to display                   ECHOCARDIOGRAM:   No results found for this or any previous visit (from the past 8760 hours).      Assessment and Plan:   1.  Paroxysmal atrial fibrillation status post PVI and posterior box in 2023 with no current symptoms.  2.  Patient being treated with aspirin 81 mg daily and no other cardiovascular agents.    Plan  1.  Continue current treatment  2.  Follow-up 1 year with Tere Oconnor CNP in cardiology clinic    Total elapsed time today with chart review , clinic visit and documentation 20 minutes    I very much appreciated the opportunity to see and assess Andree Garcia in the clinic today. Please do not hesitate to contact my office if you have any questions or concerns.      Gold Goldstein MD  Cardiac Arrhythmia Service  [facility]  [phone]       TERE PETERS    Please do not hesitate to contact me if you have any questions/concerns.     Sincerely,     Gold Goldstein MD

## 2025-07-15 NOTE — NURSING NOTE
Chief Complaint   Patient presents with    Follow Up     RETURN EP     Vitals were taken, medications reconciled, and EKG was performed.    RAKEL Babin  3:57 PM

## 2025-07-15 NOTE — PROGRESS NOTES
HPI:   Andree is a pleasant 64-year-old woman with a history of atrial fibrillation who underwent redo frequency ablation in May 2023 and is followed in the cardiology clinic by Tere Oconnor.      Post ablation Andree was given anticoagulants and then later changed to aspirin which she maintains.  She is not on any rate control medications.    Since her last clinic visit patient indicates that she has been doing well and has no new cardiovascular symptoms.  She did indicate some sense of feeling her heart moving in her chest but this seems to be infrequent and not sustained.    Patient will continue to be treated with aspirin 81 mg daily and absent any change in her clinical picture we will plan to see her again in 1 years time.      PAST MEDICAL HISTORY:  Past Medical History:   Diagnosis Date    Anxiety     developed with menopause    Chest pain     non cardiac    Costochondritis     Hyperlipidemia     Lyme disease        CURRENT MEDICATIONS:  Current Outpatient Medications   Medication Sig Dispense Refill    aspirin 81 MG EC tablet Take 81 mg by mouth daily.      Cholecalciferol (VITAMIN D3) Take 1,000 Units by mouth daily      estradiol (VIVELLE-DOT) 0.0375 MG/24HR BIW patch Place 1 patch onto the skin twice a week      Multiple Vitamin (MULTIVITAMIN OR) Take 1 tablet by mouth daily      Omega-3 Fatty Acids (OMEGA III EPA+DHA PO) Take 1 capsule by mouth daily      progesterone (PROMETRIUM) 100 MG capsule Take 1 capsule (100 mg) by mouth daily 90 capsule 3    VIIBRYD 40 MG TABS tablet TAKE 1 TABLET BY MOUTH EVERY DAY WITH FOOD         PAST SURGICAL HISTORY:  Past Surgical History:   Procedure Laterality Date    COLONOSCOPY  9/11/2012    Procedure: COLONOSCOPY;  COLONOSCOPY,  ESOPHAGOSCOPY, GASTROSCOPY, DUODENOSCOPY;  Surgeon: Dominic Gray MD;  Location:  GI    COLONOSCOPY N/A 11/22/2023    Procedure: Colonoscopy;  Surgeon: Emmy Amin MD;  Location:  GI    DILATION AND CURETTAGE      EP ABLATION  FOCAL AFIB N/A 5/4/2023    Procedure: EP Ablation Focal AFIB;  Surgeon: Geni Frye MD;  Location:  HEART CARDIAC CATH LAB    ESOPHAGOSCOPY, GASTROSCOPY, DUODENOSCOPY (EGD), COMBINED  9/11/2012    Procedure: COMBINED ESOPHAGOSCOPY, GASTROSCOPY, DUODENOSCOPY (EGD);;  Surgeon: Dominic Gray MD;  Location:  GI    EXCISE CONDYLOMA RECTUM      uterine polypectomy         ALLERGIES:     Allergies   Allergen Reactions    Sulfa Antibiotics      Hives       FAMILY HISTORY:  Family History   Problem Relation Age of Onset    Cardiovascular Father     Cerebrovascular Disease Father     C.A.D. Father     Arthritis Brother 15        juvenile arthritis    Breast Cancer Sister 50        breast cancer - in complete remission    Thyroid Disease Mother     Other - See Comments Sister         brain tumor survivor       SOCIAL HISTORY:  Social History     Tobacco Use    Smoking status: Never    Smokeless tobacco: Never   Substance Use Topics    Alcohol use: Yes     Comment: social - maybe 3 times week    Drug use: No       ROS:   Constitutional: No fever, chills, or sweats. Weight stable.   ENT: No visual disturbance, ear ache, epistaxis, sore throat.   Cardiovascular: As per HPI.   Respiratory: No cough, hemoptysis.    GI: No nausea, vomiting,   : No hematuria.   Integument: Negative.   Psychiatric: Negative.   Hematologic:  Easy bruising, no easy bleeding.  Neuro: Negative.   Endocrinology: No significant heat or cold intolerance   Musculoskeletal: No myalgia.    Exam:  /84 (BP Location: Right arm, Patient Position: Chair, Cuff Size: Adult Regular)   Pulse 75   Wt 74.8 kg (164 lb 12.8 oz)   LMP 06/19/2012   SpO2 95%   BMI 26.60 kg/m    GENERAL APPEARANCE: healthy, alert and no distress  HEENT: no icterus,  no central cyanosis  NECK: no adenopathy, no asymmetry, masses, or scars, thyroid normal to palpation and no bruits, JVP not elevated  RESPIRATORY: l no rales, rhonchi or wheezes, no use of accessory muscles,  no retractions, respirations are unlabored, normal respiratory rate  CARDIOVASCULAR: regular rhythm, normal S1 with physiologic split S2, no S3 or S4 and no murmur, click or rub, precordium quiet with normal PMI.  ABDOMEN: soft, non tender,   NEURO: alert and oriented to person/place/time, normal speech, gait and affect  SKIN: no ecchymoses, no rashes    Labs:  CBC RESULTS:   Lab Results   Component Value Date    WBC 5.6 05/04/2023    WBC 5.0 08/10/2012    RBC 4.57 05/04/2023    RBC 3.89 08/10/2012    HGB 13.2 05/04/2023    HGB 11.9 08/10/2012    HCT 41.5 05/04/2023    HCT 35.9 08/10/2012    MCV 91 05/04/2023    MCV 92 08/10/2012    MCH 28.9 05/04/2023    MCH 30.6 08/10/2012    MCHC 31.8 05/04/2023    MCHC 33.1 08/10/2012    RDW 13.4 05/04/2023    RDW 13.4 08/10/2012     05/04/2023     08/10/2012       BMP RESULTS:  Lab Results   Component Value Date     05/04/2023     08/10/2012    POTASSIUM 4.5 05/04/2023    POTASSIUM 3.9 08/10/2012    CHLORIDE 102 05/04/2023    CHLORIDE 103 08/10/2012    CO2 23 05/04/2023    CO2 27 08/10/2012    ANIONGAP 13 05/04/2023    ANIONGAP 8 08/10/2012     (H) 06/23/2023    GLC 94 08/10/2012    BUN 16.4 05/04/2023    BUN 13 08/10/2012    CR 0.62 05/04/2023    CR 0.66 08/10/2012    GFRESTIMATED >90 05/04/2023    GFRESTIMATED >90 08/10/2012    GFRESTBLACK >90 08/10/2012    PRITI 10.1 05/04/2023    PRITI 9.5 08/10/2012        INR RESULTS:  Lab Results   Component Value Date    INR 1.47 (H) 05/04/2023    INR 1.00 03/06/2023       Procedures:  PULMONARY FUNCTION TESTS:        No data to display                  ECHOCARDIOGRAM:   No results found for this or any previous visit (from the past 8760 hours).      Assessment and Plan:   1.  Paroxysmal atrial fibrillation status post PVI and posterior box in 2023 with no current symptoms.  2.  Patient being treated with aspirin 81 mg daily and no other cardiovascular agents.    Plan  1.  Continue current treatment  2.   Follow-up 1 year with Tere Allen CNP in cardiology clinic    Total elapsed time today with chart review , clinic visit and documentation 20 minutes    I very much appreciated the opportunity to see and assess Andree Garcia in the clinic today. Please do not hesitate to contact my office if you have any questions or concerns.      Gold Goldstein MD  Cardiac Arrhythmia Service  [facility]  [phone]       CC  TERE ALLEN

## 2025-07-15 NOTE — PATIENT INSTRUCTIONS
Plan:    No changes to medications  Follow up with EP OSCAR in 1 year      Your Care Team:  EP Cardiology   Telephone Number     Sammy Alfred, RN [phone]    After business hours: [phone], ask for cardiologist on-call   Non-procedure scheduling:    Mira TRIPLETT   [phone]   Procedure scheduling:    Marcela Jamison   [phone]   Device Clinic (Pacemakers, ICDs, Loop Recorders)    During business hours: [phone]  After business hours:   [phone]- select option 4 and ask for job code [id number].       Cardiovascular Clinic:   [address] [place] [address]. [place] [address]      As always, thank you for trusting us with your health care needs!    If you have further questions, please utilize Scheduling Employee Scheduling Software to contact us.

## 2025-07-16 LAB
ATRIAL RATE - MUSE: 71 BPM
DIASTOLIC BLOOD PRESSURE - MUSE: NORMAL MMHG
INTERPRETATION ECG - MUSE: NORMAL
P AXIS - MUSE: 41 DEGREES
PR INTERVAL - MUSE: 172 MS
QRS DURATION - MUSE: 70 MS
QT - MUSE: 402 MS
QTC - MUSE: 436 MS
R AXIS - MUSE: 1 DEGREES
SYSTOLIC BLOOD PRESSURE - MUSE: NORMAL MMHG
T AXIS - MUSE: 11 DEGREES
VENTRICULAR RATE- MUSE: 71 BPM

## (undated) DEVICE — Device

## (undated) DEVICE — CATH THERMOCOOL SMARTTOUCH DF CURVE

## (undated) DEVICE — PAD DEFIBRILLATOR ELECTRODE 4.25INX6IN ADULT 2001M-C

## (undated) DEVICE — PACK HEART LEFT CUSTOM

## (undated) DEVICE — KIT VENOUS FLUSH 60210177

## (undated) DEVICE — TUBE SET SMARKABLATE IRRIGATION

## (undated) DEVICE — NDL TRANSSEPTAL BRK XS 18GA 71CM BRK-1 CVD G407209

## (undated) DEVICE — CATHETER 7FR DEF LASSO NAV SH, 22P, 20MM, D D134904

## (undated) DEVICE — INTRO CATH 12CM 8.5FR FST-CATH

## (undated) DEVICE — INTRODUCER SHEATH FAST-CATH 9FRX12CM 406116

## (undated) DEVICE — SHEATH INTRO SWARTZ 8.5FR SL2 CURVE 63X80CM SUPERSTIFF

## (undated) DEVICE — INTRODUCER SHEATH FAST-CATH CATH-LOCK 7FRX12CM 406702

## (undated) DEVICE — INTRO SHEATH MICRO PLATINUM TIP 4FRX40CM 7274

## (undated) DEVICE — CATHETER EPT POLARIS X 6FR STEERABLE REPROCESSED 7003D

## (undated) DEVICE — PATCH CARTO 3 EXTERNAL REFERENCE 3D MAPPING CREFP6

## (undated) RX ORDER — HEPARIN SODIUM 1000 [USP'U]/ML
INJECTION, SOLUTION INTRAVENOUS; SUBCUTANEOUS
Status: DISPENSED
Start: 2023-05-04

## (undated) RX ORDER — FENTANYL CITRATE 50 UG/ML
INJECTION, SOLUTION INTRAMUSCULAR; INTRAVENOUS
Status: DISPENSED
Start: 2023-11-22

## (undated) RX ORDER — FENTANYL CITRATE-0.9 % NACL/PF 10 MCG/ML
PLASTIC BAG, INJECTION (ML) INTRAVENOUS
Status: DISPENSED
Start: 2023-05-04

## (undated) RX ORDER — ADENOSINE 3 MG/ML
INJECTION, SOLUTION INTRAVENOUS
Status: DISPENSED
Start: 2023-05-04

## (undated) RX ORDER — ONDANSETRON 2 MG/ML
INJECTION INTRAMUSCULAR; INTRAVENOUS
Status: DISPENSED
Start: 2023-05-04

## (undated) RX ORDER — LIDOCAINE HYDROCHLORIDE 20 MG/ML
SOLUTION OROPHARYNGEAL
Status: DISPENSED
Start: 2023-05-03

## (undated) RX ORDER — REGADENOSON 0.08 MG/ML
INJECTION, SOLUTION INTRAVENOUS
Status: DISPENSED
Start: 2023-03-21

## (undated) RX ORDER — LIDOCAINE HYDROCHLORIDE 10 MG/ML
INJECTION, SOLUTION EPIDURAL; INFILTRATION; INTRACAUDAL; PERINEURAL
Status: DISPENSED
Start: 2023-05-04

## (undated) RX ORDER — PROPOFOL 10 MG/ML
INJECTION, EMULSION INTRAVENOUS
Status: DISPENSED
Start: 2023-05-04

## (undated) RX ORDER — FENTANYL CITRATE 50 UG/ML
INJECTION, SOLUTION INTRAMUSCULAR; INTRAVENOUS
Status: DISPENSED
Start: 2023-05-03

## (undated) RX ORDER — FENTANYL CITRATE 50 UG/ML
INJECTION, SOLUTION INTRAMUSCULAR; INTRAVENOUS
Status: DISPENSED
Start: 2023-05-04

## (undated) RX ORDER — PROTAMINE SULFATE 10 MG/ML
INJECTION, SOLUTION INTRAVENOUS
Status: DISPENSED
Start: 2023-05-04

## (undated) RX ORDER — DEXAMETHASONE SODIUM PHOSPHATE 4 MG/ML
INJECTION, SOLUTION INTRA-ARTICULAR; INTRALESIONAL; INTRAMUSCULAR; INTRAVENOUS; SOFT TISSUE
Status: DISPENSED
Start: 2023-05-04